# Patient Record
Sex: FEMALE | Race: WHITE | HISPANIC OR LATINO | Employment: FULL TIME | URBAN - METROPOLITAN AREA
[De-identification: names, ages, dates, MRNs, and addresses within clinical notes are randomized per-mention and may not be internally consistent; named-entity substitution may affect disease eponyms.]

---

## 2020-12-01 ENCOUNTER — HOSPITAL ENCOUNTER (OUTPATIENT)
Dept: FAMILY MEDICINE CLINIC | Facility: CLINIC | Age: 50
Discharge: HOME | End: 2020-12-01
Attending: NURSE PRACTITIONER

## 2020-12-01 ENCOUNTER — OFFICE VISIT CONVERTED (OUTPATIENT)
Dept: FAMILY MEDICINE CLINIC | Facility: CLINIC | Age: 50
End: 2020-12-01
Attending: NURSE PRACTITIONER

## 2020-12-01 LAB
ALBUMIN SERPL-MCNC: 4.7 G/DL (ref 3.5–5)
ALBUMIN/GLOB SERPL: 1.6 {RATIO} (ref 1.4–2.6)
ALP SERPL-CCNC: 115 U/L (ref 42–98)
ALT SERPL-CCNC: 54 U/L (ref 10–40)
ANION GAP SERPL CALC-SCNC: 18 MMOL/L (ref 8–19)
APPEARANCE UR: CLEAR
AST SERPL-CCNC: 55 U/L (ref 15–50)
BASOPHILS # BLD AUTO: 0.06 10*3/UL (ref 0–0.2)
BASOPHILS NFR BLD AUTO: 0.7 % (ref 0–3)
BILIRUB SERPL-MCNC: 0.21 MG/DL (ref 0.2–1.3)
BILIRUB UR QL: NEGATIVE
BUN SERPL-MCNC: 26 MG/DL (ref 5–25)
BUN/CREAT SERPL: 23 {RATIO} (ref 6–20)
CALCIUM SERPL-MCNC: 10.2 MG/DL (ref 8.7–10.4)
CHLORIDE SERPL-SCNC: 102 MMOL/L (ref 99–111)
CHOLEST SERPL-MCNC: 157 MG/DL (ref 107–200)
CHOLEST/HDLC SERPL: 2.1 {RATIO} (ref 3–6)
COLOR UR: YELLOW
CONV ABS IMM GRAN: 0.04 10*3/UL (ref 0–0.2)
CONV CO2: 26 MMOL/L (ref 22–32)
CONV COLLECTION SOURCE (UA): NORMAL
CONV IMMATURE GRAN: 0.4 % (ref 0–1.8)
CONV TOTAL PROTEIN: 7.6 G/DL (ref 6.3–8.2)
CONV UROBILINOGEN IN URINE BY AUTOMATED TEST STRIP: 1 {EHRLICHU}/DL (ref 0.1–1)
CREAT UR-MCNC: 1.15 MG/DL (ref 0.5–0.9)
DEPRECATED RDW RBC AUTO: 45.6 FL (ref 36.4–46.3)
EOSINOPHIL # BLD AUTO: 0.35 10*3/UL (ref 0–0.7)
EOSINOPHIL # BLD AUTO: 3.9 % (ref 0–7)
ERYTHROCYTE [DISTWIDTH] IN BLOOD BY AUTOMATED COUNT: 13.2 % (ref 11.7–14.4)
EST. AVERAGE GLUCOSE BLD GHB EST-MCNC: 140 MG/DL
GFR SERPLBLD BASED ON 1.73 SQ M-ARVRAT: 56 ML/MIN/{1.73_M2}
GLOBULIN UR ELPH-MCNC: 2.9 G/DL (ref 2–3.5)
GLUCOSE SERPL-MCNC: 138 MG/DL (ref 65–99)
GLUCOSE UR QL: NEGATIVE MG/DL
HBA1C MFR BLD: 6.5 % (ref 3.5–5.7)
HCT VFR BLD AUTO: 51.2 % (ref 37–47)
HDLC SERPL-MCNC: 76 MG/DL (ref 40–60)
HGB BLD-MCNC: 16.2 G/DL (ref 12–16)
HGB UR QL STRIP: NEGATIVE
KETONES UR QL STRIP: NEGATIVE MG/DL
LDLC SERPL CALC-MCNC: 67 MG/DL (ref 70–100)
LEUKOCYTE ESTERASE UR QL STRIP: NEGATIVE
LYMPHOCYTES # BLD AUTO: 2.35 10*3/UL (ref 1–5)
LYMPHOCYTES NFR BLD AUTO: 26.4 % (ref 20–45)
MCH RBC QN AUTO: 29.7 PG (ref 27–31)
MCHC RBC AUTO-ENTMCNC: 31.6 G/DL (ref 33–37)
MCV RBC AUTO: 93.9 FL (ref 81–99)
MONOCYTES # BLD AUTO: 0.86 10*3/UL (ref 0.2–1.2)
MONOCYTES NFR BLD AUTO: 9.7 % (ref 3–10)
NEUTROPHILS # BLD AUTO: 5.24 10*3/UL (ref 2–8)
NEUTROPHILS NFR BLD AUTO: 58.9 % (ref 30–85)
NITRITE UR QL STRIP: NEGATIVE
NRBC CBCN: 0 % (ref 0–0.7)
OSMOLALITY SERPL CALC.SUM OF ELEC: 301 MOSM/KG (ref 273–304)
PH UR STRIP.AUTO: 5.5 [PH] (ref 5–8)
PLATELET # BLD AUTO: 469 10*3/UL (ref 130–400)
PMV BLD AUTO: 10.1 FL (ref 9.4–12.3)
POTASSIUM SERPL-SCNC: 4.1 MMOL/L (ref 3.5–5.3)
PROT UR QL: NEGATIVE MG/DL
RBC # BLD AUTO: 5.45 10*6/UL (ref 4.2–5.4)
SODIUM SERPL-SCNC: 142 MMOL/L (ref 135–147)
SP GR UR: 1.02 (ref 1–1.03)
T4 FREE SERPL-MCNC: 1.3 NG/DL (ref 0.9–1.8)
TRIGL SERPL-MCNC: 70 MG/DL (ref 40–150)
TSH SERPL-ACNC: 1.33 M[IU]/L (ref 0.27–4.2)
VLDLC SERPL-MCNC: 14 MG/DL (ref 5–37)
WBC # BLD AUTO: 8.9 10*3/UL (ref 4.8–10.8)

## 2021-01-08 ENCOUNTER — TELEMEDICINE CONVERTED (OUTPATIENT)
Dept: FAMILY MEDICINE CLINIC | Facility: CLINIC | Age: 51
End: 2021-01-08
Attending: NURSE PRACTITIONER

## 2021-01-22 ENCOUNTER — OFFICE VISIT CONVERTED (OUTPATIENT)
Dept: FAMILY MEDICINE CLINIC | Facility: CLINIC | Age: 51
End: 2021-01-22
Attending: NURSE PRACTITIONER

## 2021-03-17 ENCOUNTER — HOSPITAL ENCOUNTER (OUTPATIENT)
Dept: FAMILY MEDICINE CLINIC | Facility: CLINIC | Age: 51
Discharge: HOME | End: 2021-03-17
Attending: NURSE PRACTITIONER

## 2021-03-17 ENCOUNTER — OFFICE VISIT CONVERTED (OUTPATIENT)
Dept: FAMILY MEDICINE CLINIC | Facility: CLINIC | Age: 51
End: 2021-03-17
Attending: NURSE PRACTITIONER

## 2021-03-17 ENCOUNTER — CONVERSION ENCOUNTER (OUTPATIENT)
Dept: FAMILY MEDICINE CLINIC | Facility: CLINIC | Age: 51
End: 2021-03-17

## 2021-03-17 LAB
BASOPHILS # BLD AUTO: 0.05 10*3/UL (ref 0–0.2)
BASOPHILS NFR BLD AUTO: 0.6 % (ref 0–3)
CONV ABS IMM GRAN: 0.05 10*3/UL (ref 0–0.2)
CONV CREATININE URINE, RANDOM: 97.4 MG/DL (ref 10–300)
CONV IMMATURE GRAN: 0.6 % (ref 0–1.8)
CONV MICROALBUM.,U,RANDOM: <12 MG/L (ref 0–20)
DEPRECATED RDW RBC AUTO: 49.1 FL (ref 36.4–46.3)
EOSINOPHIL # BLD AUTO: 0.26 10*3/UL (ref 0–0.7)
EOSINOPHIL # BLD AUTO: 3.1 % (ref 0–7)
ERYTHROCYTE [DISTWIDTH] IN BLOOD BY AUTOMATED COUNT: 13.8 % (ref 11.7–14.4)
HCT VFR BLD AUTO: 46.5 % (ref 37–47)
HGB BLD-MCNC: 14.4 G/DL (ref 12–16)
LYMPHOCYTES # BLD AUTO: 2.03 10*3/UL (ref 1–5)
LYMPHOCYTES NFR BLD AUTO: 24.3 % (ref 20–45)
MCH RBC QN AUTO: 29.9 PG (ref 27–31)
MCHC RBC AUTO-ENTMCNC: 31 G/DL (ref 33–37)
MCV RBC AUTO: 96.7 FL (ref 81–99)
MICROALBUMIN/CREAT UR: 12.3 MG/G{CRE} (ref 0–35)
MONOCYTES # BLD AUTO: 0.93 10*3/UL (ref 0.2–1.2)
MONOCYTES NFR BLD AUTO: 11.1 % (ref 3–10)
NEUTROPHILS # BLD AUTO: 5.05 10*3/UL (ref 2–8)
NEUTROPHILS NFR BLD AUTO: 60.3 % (ref 30–85)
NRBC CBCN: 0 % (ref 0–0.7)
PLATELET # BLD AUTO: 458 10*3/UL (ref 130–400)
PMV BLD AUTO: 10.7 FL (ref 9.4–12.3)
RBC # BLD AUTO: 4.81 10*6/UL (ref 4.2–5.4)
WBC # BLD AUTO: 8.37 10*3/UL (ref 4.8–10.8)

## 2021-03-18 LAB
ALBUMIN SERPL-MCNC: 4.6 G/DL (ref 3.5–5)
ALBUMIN/GLOB SERPL: 1.6 {RATIO} (ref 1.4–2.6)
ALP SERPL-CCNC: 115 U/L (ref 42–98)
ALT SERPL-CCNC: 32 U/L (ref 10–40)
ANION GAP SERPL CALC-SCNC: 19 MMOL/L (ref 8–19)
AST SERPL-CCNC: 31 U/L (ref 15–50)
BILIRUB SERPL-MCNC: <0.15 MG/DL (ref 0.2–1.3)
BUN SERPL-MCNC: 13 MG/DL (ref 5–25)
BUN/CREAT SERPL: 22 {RATIO} (ref 6–20)
CALCIUM SERPL-MCNC: 8.9 MG/DL (ref 8.7–10.4)
CHLORIDE SERPL-SCNC: 108 MMOL/L (ref 99–111)
CHOLEST SERPL-MCNC: 140 MG/DL (ref 107–200)
CHOLEST/HDLC SERPL: 1.8 {RATIO} (ref 3–6)
CONV CO2: 24 MMOL/L (ref 22–32)
CONV TOTAL PROTEIN: 7.4 G/DL (ref 6.3–8.2)
CREAT UR-MCNC: 0.59 MG/DL (ref 0.5–0.9)
EST. AVERAGE GLUCOSE BLD GHB EST-MCNC: 137 MG/DL
GFR SERPLBLD BASED ON 1.73 SQ M-ARVRAT: >60 ML/MIN/{1.73_M2}
GLOBULIN UR ELPH-MCNC: 2.8 G/DL (ref 2–3.5)
GLUCOSE SERPL-MCNC: 87 MG/DL (ref 65–99)
HBA1C MFR BLD: 6.4 % (ref 3.5–5.7)
HDLC SERPL-MCNC: 76 MG/DL (ref 40–60)
LDLC SERPL CALC-MCNC: 48 MG/DL (ref 70–100)
OSMOLALITY SERPL CALC.SUM OF ELEC: 303 MOSM/KG (ref 273–304)
POTASSIUM SERPL-SCNC: 3.9 MMOL/L (ref 3.5–5.3)
SODIUM SERPL-SCNC: 147 MMOL/L (ref 135–147)
TRIGL SERPL-MCNC: 78 MG/DL (ref 40–150)
VLDLC SERPL-MCNC: 16 MG/DL (ref 5–37)

## 2021-05-10 NOTE — H&P
History and Physical      Patient Name: Beba Ambrose   Patient ID: [id number]   Sex: Female   YOB: 1970    Primary Care Provider: Ross Fabian MD   Referring Provider: Ross Fabian MD    Visit Date: December 1, 2020    Provider: AJ Santos   Location: [facility]   Location Address: [address] [place] [address], [address] [place] [address]  [id number]   Location Phone: [phone]          Chief Complaint  · new pt      History Of Present Illness  Beba Ambrose is a 50 year old /White female who presents for evaluation and treatment of:      She presents as a new patient today to establish care.  Her previous provider was Dr. Nuñez who has closed his practice.    She states she has a history of ADHD and is currently stable on Adderall.    Hypertension: Blood pressure stable on lisinopril 10 mg twice daily.    History of asthma: She is currently stable, only uses albuterol inhaler as needed.    Depression screening positive, PHQ 9 score is 9.  She admits to feeling depressed, states mostly due to current situation of having to work from home.  She is a .  She states she also does have a history of depression and she has been on Zoloft in the past.  She does feel that she would benefit to take an antidepressant.    She states that she has a history of glucose in her urine but they have never found a cause.  She has seen urology also for blood in her urine.       Past Medical History  Disease Name Date Onset Notes   Acute cystitis 07/15/2016 --    Allergies --  --    Anxiety --  --    Asthma --  --    Broken Bones --  --    Depression --  --    Diabetes --  --    High blood pressure --  --    Migraine headache --  --          Past Surgical History  Procedure Name Date Notes   Breast augmentation --  --    Vaginal biopsy --  --          Medication List  Name Date Started Instructions   Adderall 20 mg oral tablet  --    albuterol sulfate  "inhalation  --    lisinopril 10 mg oral tablet  take 1 tablet (10 mg) by oral route once daily         Allergy List  Allergen Name Date Reaction Notes   NO KNOWN DRUG ALLERGIES --  --  --          Family Medical History  Disease Name Relative/Age Notes   Cancer, Unspecified  --    Parkinson's Disease Father/   --    *No Known Family History  --          Social History  Finding Status Start/Stop Quantity Notes   Alcohol Current some day 0/0 --  drinks monthly; wine   Caffeine Current every day 0/0 --  drinks regularly; soft drinks; 1-2 times per day   Second hand smoke exposure Current some day 0/0 --  yes   Tobacco Former 1/0 1/2 ppd current everyday smoker; started smoking at age 2; smoked 10 cigarette(s) per day         Review of Systems  · Constitutional  o Denies  o : fever, fatigue, weight loss, weight gain  · Cardiovascular  o Denies  o : lower extremity edema, claudication, chest pressure, palpitations  · Respiratory  o Denies  o : shortness of breath, wheezing, cough, hemoptysis, dyspnea on exertion  · Gastrointestinal  o Denies  o : nausea, vomiting, diarrhea, constipation, abdominal pain  · Genitourinary  o Denies  o : urgency, frequency  · Integument  o Denies  o : rash, itching  · Neurologic  o Admits  o : difficulty concentrating  o Denies  o : altered mental status  · Musculoskeletal  o Denies  o : joint pain, joint swelling  · Endocrine  o Denies  o : cold intolerance, central obesity  · Psychiatric  o Admits  o : depression, difficulty sleeping  o Denies  o : anxiety, suicidal ideation, homicidal ideation      Vitals  Date Time BP Position Site L\R Cuff Size HR RR TEMP (F) WT  HT  BMI kg/m2 BSA m2 O2 Sat FR L/min FiO2        12/01/2020 08:46 /72 Sitting    106 - R  98.1 97lbs 16oz 5'  2\" 17.92 1.39 93 %            Physical Examination  · Constitutional  o Appearance  o : no acute distress, well-nourished  · Head and Face  o Head  o :   § Inspection  § : atraumatic, " normocephalic  · Neck  o Thyroid  o : gland size normal, nontender, no nodules or masses present on palpation, symmetric  · Respiratory  o Respiratory Effort  o : breathing comfortably, symmetric chest rise  o Auscultation of Lungs  o : clear to asculatation bilaterally, no wheezes, rales, or rhonchii  · Cardiovascular  o Heart  o :   § Auscultation of Heart  § : regular rate and rhythm, no murmurs, rubs, or gallops  o Peripheral Vascular System  o :   § Extremities  § : no edema  · Lymphatic  o Neck  o : no lymphadenopathy present  · Neurologic  o Mental Status Examination  o :   § Orientation  § : grossly oriented to person, place and time  o Gait and Station  o :   § Gait Screening  § : normal gait  · Psychiatric  o General  o : normal mood and affect  o Presence of Abnormal Thoughts  o : no hallucinations, no delusions present, no psychotic thoughts, no homicidal ideation, no suicidal ideation, no evidence of obsessional thinking          Assessment  · Visit for screening mammogram     V76.12/Z12.31  · Asthma     493.90/J45.909  Asthma stable, has albuterol rescue inhaler.  · Depression     311/F32.9  We discussed her depression and decided to start her on Zoloft 50 mg, advised patient to start with a half a tablet once daily for 1 week and then increase to full tablet.  · Essential hypertension     401.9/I10  Blood pressure stable on Lisinopril 10 mg twice daily.  · Glucose found in urine on examination     791.5/R81  History of glucose in her urine, we will check an A1c today and we will send a urinalysis for microscopy.  · ADHD (attention deficit hyperactivity disorder)     314.01/F90.9  I discussed with her that I cannot prescribe Adderall. I will refer her to psychiatry.      Plan  · Orders  o Screening Mammography; Bilateral 3D (71957, , [address]106) - V76.12/Z12.31 - 12/01/2020  o HTN/Lipid Panel (CMP, Lipid) [facility] (51212, 37814) - 401.9/I10 - 12/01/2020  o CBC with Auto Diff [facility] (29824) - 401.9/I10 -  12/01/2020  o Hgb A1c [facility] (88385) - 791.5/R81, 401.9/I10, 493.90/J45.909, 314.01/F90.9 - 12/01/2020  o Thyroid Profile (90717, 51416, THYII) - 791.5/R81, 401.9/I10, 493.90/J45.909, 314.01/F90.9 - 12/01/2020  o Urinalysis with Reflex Microscopy ([facility]) (41732) - 791.5/R81, 401.9/I10, 493.90/J45.909, 314.01/F90.9 - 12/01/2020  o ACO-39: Current medications updated and reviewed (, 1159F) - - 12/01/2020  o ACO-18: Positive screen for clinical depression using a standardized tool and a follow-up plan documented () - 311/F32.9, 314.01/F90.9 - 12/01/2020   12 pts  o Psychiatric Consultation (PSYCH) - 314.01/F90.9, 311/F32.9 - 12/01/2020   AJ De La Paz, pt is on Adderall  · Medications  o Zoloft 50 mg oral tablet   SIG: take 1 tablet (50 mg) by oral route once daily for 30 days   DISP: (30) Tablet with 5 refills  Prescribed on 12/01/2020     o albuterol sulfate 90 mcg/actuation inhalation HFA aerosol inhaler   SIG: inhale 2 puffs (180 mcg) by inhalation route every 4 hours as needed for 30 days   DISP: (1) Inhaler with 11 refills  Adjusted on 12/01/2020     o lisinopril 10 mg oral tablet   SIG: take 2 tablets (20 mg) by oral route once daily for 30 days   DISP: (60) Tablet with 5 refills  Adjusted on 12/01/2020     · Instructions  o Discussed the need for therapy, either with a certified counselor, psychologist, and/or family . If no improvement is noted or worsening of their condition, return to office or ER. But also discussed with patient that if they are non-responsive to the type of medication they may need to see a psychiatrist for further evaluation and management.  o Patient was given an SSRI/SSNRI medication and warned of possible side effects of the medication including potential for increased risk of suicidal thoughts and feelings. Patient was instructed that if they begin to exhibit any of these effects they will discontinue the medication immediately and contact our office or the ER  ASAP.  o Patient was educated/instructed on their diagnosis, treatment and medications prior to discharge from the clinic today.  o Patient instructed to seek medical attention urgently for new or worsening symptoms.  o Call the office with any concerns or questions.  o Discussed Covid-19 precautions including, but not limited to, social distancing, avoid touching your face, and hand washing.   · Disposition  o Return to clinic in 4 weeks            Electronically Signed by: AJ Santos -Author on December 1, 2020 01:15:59 PM

## 2021-05-14 VITALS
SYSTOLIC BLOOD PRESSURE: 162 MMHG | BODY MASS INDEX: 27.14 KG/M2 | WEIGHT: 147.5 LBS | OXYGEN SATURATION: 98 % | DIASTOLIC BLOOD PRESSURE: 92 MMHG | HEIGHT: 62 IN | TEMPERATURE: 97.5 F | HEART RATE: 101 BPM

## 2021-05-14 VITALS
OXYGEN SATURATION: 97 % | BODY MASS INDEX: 27.88 KG/M2 | SYSTOLIC BLOOD PRESSURE: 126 MMHG | HEART RATE: 106 BPM | DIASTOLIC BLOOD PRESSURE: 78 MMHG | WEIGHT: 151.5 LBS | HEIGHT: 62 IN | TEMPERATURE: 98 F

## 2021-05-14 VITALS
SYSTOLIC BLOOD PRESSURE: 128 MMHG | BODY MASS INDEX: 18.03 KG/M2 | HEIGHT: 62 IN | HEART RATE: 106 BPM | OXYGEN SATURATION: 93 % | WEIGHT: 98 LBS | DIASTOLIC BLOOD PRESSURE: 72 MMHG | TEMPERATURE: 98.1 F

## 2021-05-14 NOTE — PROGRESS NOTES
Progress Note      Patient Name: Beba Ambrose   Patient ID: [id number]   Sex: Female   YOB: 1970    Primary Care Provider: Margarita ROCHA   Referring Provider: Margarita ROCHA    Visit Date: January 22, 2021    Provider: AJ Santos   Location: [facility]   Location Address: [address] [place] [address], [address] [place] [address]  [id number]   Location Phone: [phone]          Chief Complaint  · still having problems breathing      History Of Present Illness  Beba Ambrose is a 50 year old /White female who presents for evaluation and treatment of:      She is here today for an acute visit.  She states she is still having trouble breathing even after finishing prednisone Dosepak.  She has a history of asthma and she states it always flares up in the wintertime.  She is complaining of wheezing mostly at night, shortness of breath and occasional productive cough.  She is not currently on any maintenance medication.  She uses her albuterol inhaler as needed.    She does have a history of diabetes type 2, non-insulin-dependent.  She is stable on Januvia 50 mg daily.  Her last A1c was 6.5% on 12/1/2020.       Past Medical History  Disease Name Date Onset Notes   Acute cystitis 07/15/2016 --    ADHD (attention deficit hyperactivity disorder) 12/01/2020 --    Allergies --  --    Anxiety --  --    Asthma --  --    Broken Bones --  --    Depression --  --    Depression 12/01/2020 --    Diabetes --  --    Diabetes mellitus, type 2 01/08/2021 --    Essential hypertension 12/01/2020 --    Glucose found in urine on examination 12/01/2020 History of glucose in her urine, we will check an A1c today and we will send a urinalysis for microscopy.   High blood pressure --  --    Migraine headache --  --          Past Surgical History  Procedure Name Date Notes   Breast augmentation --  --    Vaginal biopsy --  --          Medication List  Name Date Started  "Instructions   Adderall 20 mg oral tablet  --    albuterol sulfate 90 mcg/actuation inhalation HFA aerosol inhaler 12/01/2020 inhale 2 puffs (180 mcg) by inhalation route every 4 hours as needed for 30 days   Januvia 50 mg oral tablet 01/08/2021 take 1 tablet (50 mg) by oral route once daily for 30 days   lisinopril 10 mg oral tablet 12/01/2020 take 2 tablets (20 mg) by oral route once daily for 30 days   Zoloft 50 mg oral tablet 12/01/2020 take 1 tablet (50 mg) by oral route once daily for 30 days         Allergy List  Allergen Name Date Reaction Notes   NO KNOWN DRUG ALLERGIES --  --  --    metformin --  upset stomach --        Allergies Reconciled  Family Medical History  Disease Name Relative/Age Notes   Cancer, Unspecified  --    Parkinson's Disease Father/   --    *No Known Family History  --          Social History  Finding Status Start/Stop Quantity Notes   Alcohol Current some day 0/0 --  drinks monthly; wine   Caffeine Current every day 0/0 --  drinks regularly; soft drinks; 1-2 times per day   Second hand smoke exposure Current some day 0/0 --  yes   Tobacco Former 1/0 1/2 ppd current everyday smoker; started smoking at age 2; smoked 10 cigarette(s) per day         Review of Systems  · Constitutional  o Denies  o : fever, fatigue, weight loss, weight gain  · Cardiovascular  o Denies  o : lower extremity edema, claudication, chest pressure, palpitations  · Respiratory  o Admits  o : shortness of breath, wheezing, productive cough  o Denies  o : hemoptysis  · Gastrointestinal  o Denies  o : nausea, vomiting, diarrhea, constipation, abdominal pain  · Integument  o Denies  o : rash, itching  · Endocrine  o Denies  o : polyuria, polydipsia      Vitals  Date Time BP Position Site L\R Cuff Size HR RR TEMP (F) WT  HT  BMI kg/m2 BSA m2 O2 Sat FR L/min FiO2 HC       01/22/2021 01:27 /92 Sitting    101 - R  97.5 147lbs 8oz 5'  2\" 26.98 1.71 98 %            Physical " Examination  · Constitutional  o Appearance  o : no acute distress, well-nourished  · Head and Face  o Head  o :   § Inspection  § : atraumatic, normocephalic  · Respiratory  o Respiratory Effort  o : breathing comfortably, symmetric chest rise  o Auscultation of Lungs  o : clear to asculatation bilaterally, no wheezes, rales, or rhonchii  · Cardiovascular  o Heart  o :   § Auscultation of Heart  § : regular rate and rhythm, no murmurs, rubs, or gallops  o Peripheral Vascular System  o :   § Extremities  § : no edema  · Neurologic  o Mental Status Examination  o :   § Orientation  § : grossly oriented to person, place and time  o Gait and Station  o :   § Gait Screening  § : normal gait  · Psychiatric  o General  o : normal mood and affect          Assessment  · Mild persistent asthma with acute exacerbation     493.92/J45.31  Since she is having persistent asthma uncontrolled, I will start her on Symbicort inhaler daily, advised her to rinse her mouth after each use. We will also give her another prednisone Dosepak. Advised her to continue use her albuterol inhaler as needed. Advised her to call next week if not improving.  · Diabetes mellitus, type 2     250.00/E11.9  Stable on Januvia      Plan  · Orders  o ACO-14: Influenza immunization was not administered for reasons documented [facility] () - - 01/22/2021   refused  o ACO-39: Current medications updated and reviewed (, 1159F) - - 01/22/2021  · Medications  o prednisone 20 mg oral tablet   SIG: take 3 tabs (60mg) daily x 2 days, then 2 tabs daily (40mg) x 2 days, then 1 tab (20mg) daily x 2 days   DISP: (12) Tablet with 0 refills  Prescribed on 01/22/2021     o Symbicort 160-4.5 mcg/actuation inhalation HFA aerosol inhaler   SIG: inhale 2 puffs by inhalation route 2 times per day in the morning and evening for 30 days   DISP: (1) Inhaler with 2 refills  Prescribed on 01/22/2021     · Instructions  o Patient was educated/instructed on their diagnosis,  treatment and medications prior to discharge from the clinic today.  o Patient instructed to seek medical attention urgently for new or worsening symptoms.  o Call the office with any concerns or questions.  · Disposition  o Call or Return if symptoms worsen or persist.            Electronically Signed by: AJ Santos -Author on January 22, 2021 02:12:08 PM

## 2021-05-14 NOTE — PROGRESS NOTES
Progress Note      Patient Name: Beba Ambrose   Patient ID: [id number]   Sex: Female   YOB: 1970    Primary Care Provider: Margarita ROCHA   Referring Provider: Margarita ROCHA    Visit Date: March 17, 2021    Provider: AJ Santos   [place] [facility]   Location Address: [address] [place] [address], [address]  [id number]   Location Phone: [phone]          Chief Complaint  · 3 month follow up      History Of Present Illness  Beba Ambrose is a 51 year old /White female who presents for evaluation and treatment of:      Patient present today for 3 month follow up. Requesting accomadation for remote work location to be filled out. Patient is also requesting the shingles vaccine. Patient stated that she was interested in cologuard. There is no family history of colon cancer.     Hx DM II: Patient last A1C 12/01/20 was 6.5%. Patient currently taking Januvia 50 mg and is tolerating well. Patient states that she believes her last eye exam was over three years ago. Discussed with patient the importance of annual eye examination for person with diabetes and importance of doing self foot exams.      Hx nicotine dependency: Patient states that she quit smoking approximately 2 1/2 years ago.     Hx HTN: Patient has been stable on lisinopril 10mg. Blood pressure at visit today was 126/78.    Hx Asthma: Patient has been stable on albuterol and symbicort.            Past Medical History  Disease Name Date Onset Notes   Acute cystitis 07/15/2016 --    ADHD (attention deficit hyperactivity disorder) 12/01/2020 --    Allergies --  --    Anxiety --  --    Asthma --  --    Broken Bones --  --    Depression --  --    Depression 12/01/2020 --    Diabetes --  --    Diabetes mellitus, type 2 01/08/2021 --    Essential hypertension 12/01/2020 --    Glucose found in urine on examination 12/01/2020 History of glucose in her urine, we will check an A1c  today and we will send a urinalysis for microscopy.   High blood pressure --  --    Migraine headache --  --          Past Surgical History  Procedure Name Date Notes   Breast augmentation --  --    Vaginal biopsy --  --          Medication List  Name Date Started Instructions   Adderall 20 mg oral tablet  --    albuterol sulfate 90 mcg/actuation inhalation HFA aerosol inhaler 12/01/2020 inhale 2 puffs (180 mcg) by inhalation route every 4 hours as needed for 30 days   Januvia 50 mg oral tablet 01/08/2021 take 1 tablet (50 mg) by oral route once daily for 30 days   lisinopril 10 mg oral tablet 12/01/2020 take 2 tablets (20 mg) by oral route once daily for 30 days   Symbicort 160-4.5 mcg/actuation inhalation HFA aerosol inhaler 01/22/2021 inhale 2 puffs by inhalation route 2 times per day in the morning and evening for 30 days   Zoloft 100 mg oral tablet  take 1 tablet (100 mg) by oral route once daily         Allergy List  Allergen Name Date Reaction Notes   NO KNOWN DRUG ALLERGIES --  --  --    metformin --  upset stomach --          Family Medical History  Disease Name Relative/Age Notes   Cancer, Unspecified  --    Parkinson's Disease Father/   --    *No Known Family History  --          Social History  Finding Status Start/Stop Quantity Notes   Alcohol Current some day 0/0 --  drinks monthly; wine   Caffeine Current every day 0/0 --  drinks regularly; soft drinks; 1-2 times per day   Second hand smoke exposure Current some day 0/0 --  yes   Tobacco Former 1/0 1/2 ppd current everyday smoker; started smoking at age 2; smoked 10 cigarette(s) per day         Review of Systems  · Constitutional  o Denies  o : fever, fatigue, weight loss, weight gain  · Cardiovascular  o Denies  o : lower extremity edema, claudication, chest pressure, palpitations  · Respiratory  o Denies  o : shortness of breath, wheezing, cough, hemoptysis, dyspnea on exertion  · Gastrointestinal  o Denies  o : nausea, vomiting, diarrhea,  "constipation, abdominal pain  · Genitourinary  o Denies  o : urgency, frequency  · Endocrine  o Denies  o : polyuria, polydipsia      Vitals  Date Time BP Position Site L\R Cuff Size HR RR TEMP (F) WT  HT  BMI kg/m2 BSA m2 O2 Sat FR L/min FiO2 HC       03/17/2021 02:04 /78 Sitting    106 - R  98 151lbs 8oz 5'  2\" 27.71 1.73 97 %            Physical Examination  · Constitutional  o Appearance  o : no acute distress, well-nourished  · Head and Face  o Head  o :   § Inspection  § : atraumatic, normocephalic  · Neck  o Thyroid  o : gland size normal, nontender, no nodules or masses present on palpation, symmetric  · Respiratory  o Respiratory Effort  o : breathing comfortably, symmetric chest rise  o Auscultation of Lungs  o : clear to asculatation bilaterally, no wheezes, rales, or rhonchii  · Cardiovascular  o Heart  o :   § Auscultation of Heart  § : regular rate and rhythm, no murmurs, rubs, or gallops  o Peripheral Vascular System  o :   § Extremities  § : no edema  · Lymphatic  o Neck  o : no lymphadenopathy present  · Psychiatric  o General  o : normal mood and affect          Assessment  · Need for shingles vaccine     V04.89/Z23  Order sent to pharmacy for shingrix. Patient informed not to receive within two weeks of any other vaccine in order to determine side effects and because it is a live vaccine.  · Screening for colon cancer     V76.51/Z12.11  Order sent for cologuard.  · Asthma     493.90/J45.909  Patient stable on medication as listed.  · Diabetes mellitus, type 2     250.00/E11.9  Patient stable on medication as listed. Will recheck A1C and microalbumin today. Patient will receive call with results.  · Essential hypertension     401.9/I10  Patient stable on medication as listed.      Plan  · Orders  o Cologuard (68435) - V76.51/Z12.11 - 03/17/2021  o Diabetes 1 Panel (CMP, Lipid, A1c) [facility] (71001, 07128, 65361) - 250.00/E11.9 - 03/17/2021  o CBC with Auto Diff [facility] (57387) - 250.00/E11.9 - " 03/17/2021  o Urine microalbumin (33526) - 250.00/E11.9, 401.9/I10 - 03/17/2021  o ACO-14: Influenza immunization was not administered for reasons documented [facility] () - - 03/17/2021   patient declines  o ACO-39: Current medications updated and reviewed (1159F, ) - - 03/17/2021  · Medications  o Shingrix (PF) 50 mcg/0.5 mL intramuscular suspension for reconstitution   SIG: inject 0.5 ml (50 mcg) by intramuscular route once repeat 0.5 mL dose 2 to 6 months after the first dose (total of 2 doses)   DISP: (1) Kit with 0 refills  Prescribed on 03/17/2021     o Medications have been Reconciled  o Transition of Care or Provider Policy  · Instructions  o Discussed the need for Shingles vaccination with patient.   o Continue blood sugar monitoring daily and record. Bring your log to office visits. Call the office for readings below 70 and above 250 or any complications.  o Daily foot care. Avoid walking barefoot. Annual Dilated Eye Exam.  o Discussed with patient blood pressure monitoring, hemoglobin A1C levels need to be below 7.0, and LDL (Lipid) goals below 70.  o Patient was educated/instructed on their diagnosis, treatment and medications prior to discharge from the clinic today.  o Patient instructed to seek medical attention urgently for new or worsening symptoms.  o Call the office with any concerns or questions.  · Disposition  o Return to clinic in 3 months            Electronically Signed by: AJ Santos -Author on March 18, 2021 03:25:28 PM

## 2021-05-14 NOTE — PROGRESS NOTES
Progress Note      Patient Name: Beba Ambrose   Patient ID: [id number]   Sex: Female   YOB: 1970    Primary Care Provider: Margarita ROCHA   Referring Provider: Margarita ROCHA    Visit Date: January 8, 2021    Provider: AJ Santos   Location: [facility]   Location Address: [address] [place] [address], [address]  [id number]   Location Phone: [phone]          Chief Complaint  · Discussed medication      History Of Present Illness  Video Conferencing Visit  Beba Ambrose is a 50 year old /White female who is presenting for evaluation via video conferencing via [employer]. Verbal consent obtained before beginning visit.   The following staff were present during this visit: AJ Orellana.   Beba Ambrose is a 50 year old /White female who presents for evaluation and treatment of:      She is newly diagnosed with diabetes type 2 with A1c of 6.5%.  She was started on Metformin 500 mg twice daily with food but she states she is not tolerating the medication.  She complains it is upsetting her stomach.    She is complaining that her asthma is flared up and that she has been wheezing lately.  She has been using albuterol inhaler some but she is requesting a prescription for prednisone Dosepak.    History of ADHD: She has been referred to psychiatric nurse practitioner Jed Guillermo but does not have an appointment until 1/20/2021.  She had been on Adderall 20 mg 3 times daily from her previous provider.  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  She states that her ex-spouse passed away and she is now the sole provider for her family and is a single mom.  She is going through quite a bit of stressors right now and is having a hard time focusing.       Review of Systems  · Constitutional  o Denies  o : fever, fatigue, weight loss, weight  gain  · Cardiovascular  o Denies  o : lower extremity edema, claudication, chest pressure, palpitations  · Respiratory  o Admits  o : wheezing  o Denies  o : shortness of breath, cough, hemoptysis, dyspnea on exertion  · Gastrointestinal  o Admits  o : nausea  o Denies  o : vomiting, diarrhea, constipation  · Genitourinary  o Denies  o : urgency, frequency  · Integument  o Denies  o : rash, itching  · Neurologic  o Admits  o : difficulty concentrating  o Denies  o : altered mental status  · Psychiatric  o Denies  o : anxiety, depression, suicidal ideation, homicidal ideation      Physical Examination  · Constitutional  o Appearance  o : no acute distress, well-nourished  · Head and Face  o Head  o :   § Inspection  § : atraumatic, normocephalic  · Respiratory  o Respiratory Effort  o : breathing comfortably, symmetric chest rise  · Neurologic  o Mental Status Examination  o :   § Orientation  § : grossly oriented to person, place and time  · Psychiatric  o General  o : normal mood and affect  o Presence of Abnormal Thoughts  o : no hallucinations, no delusions present, no psychotic thoughts, no homicidal ideation, no suicidal ideation, no evidence of obsessional thinking          Assessment  · Asthma     493.90/J45.909  Asthma flareup, I will give her prednisone Dosepak. Advised to continue using albuterol inhaler or nebulizer. Advised her to notify me if she is not feeling better next week.  · Diabetes mellitus, type 2     250.00/E11.9  I will have her stop Metformin since she is having upset stomach. I will start her on Januvia 50 mg once daily.  · ADHD (attention deficit hyperactivity disorder)     314.01/F90.9  I discussed her case with Dr. Johnston who is agreed to give her 2-week supply of Adderall until she can get in with the psych NP.      Plan  · Orders  o ACO-39: Current medications updated and reviewed (, 1159F) - - 01/08/2021  · Medications  o Januvia 50 mg oral tablet   SIG: take 1 tablet (50 mg) by  oral route once daily for 30 days   DISP: (30) Tablet with 2 refills  Prescribed on 01/08/2021     o prednisone 20 mg oral tablet   SIG: take 3 tabs (60mg) daily x 2 days, then 2 tabs daily (40mg) x 2 days, then 1 tab (20mg) daily x 2 days   DISP: (12) Tablet with 0 refills  Prescribed on 01/08/2021     o metformin 500 mg oral tablet   SIG: take 1 tablet (500 mg) by oral route 2 times per day with morning and evening meals for 30 days   DISP: (60) Tablet with 2 refills  Discontinued on 01/08/2021     · Instructions  o Discussed with patient blood pressure monitoring, hemoglobin A1C levels need to be below 7.0, and LDL (Lipid) goals below 70.  o See note for indication of controlled substance. Georgi has been reviewed. After discussion of risks and benefits of medication, I have determined patient is suitable for Rx while demonstrating the ability to safely follow and administer the medication plan. Patient understands that this is a temporary prescription until she can get into see the psychiatric nurse practitioner.  o Patient was educated/instructed on their diagnosis, treatment and medications prior to discharge from the clinic today.  o Patient instructed to seek medical attention urgently for new or worsening symptoms.  o Call the office with any concerns or questions.  · Disposition  o Return to clinic in 2 months            Electronically Signed by: AJ Santos -Author on January 8, 2021 01:07:56 PM

## 2021-05-22 ENCOUNTER — TRANSCRIBE ORDERS (OUTPATIENT)
Dept: ADMINISTRATIVE | Facility: HOSPITAL | Age: 51
End: 2021-05-22

## 2021-05-22 DIAGNOSIS — Z12.31 VISIT FOR SCREENING MAMMOGRAM: Primary | ICD-10-CM

## 2021-06-14 PROBLEM — F32.A DEPRESSION: Status: ACTIVE | Noted: 2020-12-01

## 2021-06-14 PROBLEM — T14.8XXA BROKEN BONES: Status: ACTIVE | Noted: 2021-06-14

## 2021-06-14 PROBLEM — J45.909 ASTHMA: Status: ACTIVE | Noted: 2021-06-14

## 2021-06-14 PROBLEM — F41.9 ANXIETY: Status: ACTIVE | Noted: 2021-06-14

## 2021-06-14 PROBLEM — I10 HIGH BLOOD PRESSURE: Status: ACTIVE | Noted: 2020-12-01

## 2021-06-14 PROBLEM — E11.9 DIABETES: Status: ACTIVE | Noted: 2021-01-08

## 2021-06-14 PROBLEM — F90.9 ADHD (ATTENTION DEFICIT HYPERACTIVITY DISORDER): Status: ACTIVE | Noted: 2020-12-01

## 2021-06-14 PROBLEM — R81 GLUCOSE FOUND IN URINE ON EXAMINATION: Status: ACTIVE | Noted: 2020-12-01

## 2021-06-14 PROBLEM — G43.909 MIGRAINE HEADACHE: Status: ACTIVE | Noted: 2021-06-14

## 2021-06-14 RX ORDER — LISINOPRIL 10 MG/1
10 TABLET ORAL DAILY
COMMUNITY
End: 2021-06-18 | Stop reason: SDUPTHER

## 2021-06-14 RX ORDER — BUDESONIDE AND FORMOTEROL FUMARATE DIHYDRATE 160; 4.5 UG/1; UG/1
2 AEROSOL RESPIRATORY (INHALATION)
COMMUNITY
End: 2021-08-23

## 2021-06-14 RX ORDER — SERTRALINE HYDROCHLORIDE 100 MG/1
TABLET, FILM COATED ORAL
COMMUNITY
Start: 2021-05-06 | End: 2021-06-18 | Stop reason: SDUPTHER

## 2021-06-14 RX ORDER — SERTRALINE HYDROCHLORIDE 100 MG/1
100 TABLET, FILM COATED ORAL DAILY
COMMUNITY
End: 2021-12-21 | Stop reason: SDUPTHER

## 2021-06-14 RX ORDER — ALBUTEROL SULFATE 90 UG/1
2 AEROSOL, METERED RESPIRATORY (INHALATION) EVERY 4 HOURS PRN
COMMUNITY
End: 2022-02-07

## 2021-06-14 RX ORDER — DEXTROAMPHETAMINE SACCHARATE, AMPHETAMINE ASPARTATE, DEXTROAMPHETAMINE SULFATE AND AMPHETAMINE SULFATE 5; 5; 5; 5 MG/1; MG/1; MG/1; MG/1
TABLET ORAL
COMMUNITY
End: 2022-09-07

## 2021-06-18 ENCOUNTER — OFFICE VISIT (OUTPATIENT)
Dept: FAMILY MEDICINE CLINIC | Facility: CLINIC | Age: 51
End: 2021-06-18

## 2021-06-18 VITALS
BODY MASS INDEX: 29 KG/M2 | WEIGHT: 157.6 LBS | HEART RATE: 98 BPM | OXYGEN SATURATION: 100 % | SYSTOLIC BLOOD PRESSURE: 152 MMHG | HEIGHT: 62 IN | TEMPERATURE: 97.5 F | DIASTOLIC BLOOD PRESSURE: 76 MMHG

## 2021-06-18 DIAGNOSIS — F90.9 ATTENTION DEFICIT HYPERACTIVITY DISORDER (ADHD), UNSPECIFIED ADHD TYPE: ICD-10-CM

## 2021-06-18 DIAGNOSIS — I10 ESSENTIAL HYPERTENSION: ICD-10-CM

## 2021-06-18 DIAGNOSIS — F32.A DEPRESSION, UNSPECIFIED DEPRESSION TYPE: ICD-10-CM

## 2021-06-18 DIAGNOSIS — E11.9 TYPE 2 DIABETES MELLITUS WITHOUT COMPLICATION, WITHOUT LONG-TERM CURRENT USE OF INSULIN (HCC): Primary | ICD-10-CM

## 2021-06-18 DIAGNOSIS — J45.909 ASTHMA, UNSPECIFIED ASTHMA SEVERITY, UNSPECIFIED WHETHER COMPLICATED, UNSPECIFIED WHETHER PERSISTENT: ICD-10-CM

## 2021-06-18 LAB
ALBUMIN SERPL-MCNC: 4.7 G/DL (ref 3.5–5.2)
ALBUMIN/GLOB SERPL: 1.7 G/DL
ALP SERPL-CCNC: 112 U/L (ref 39–117)
ALT SERPL W P-5'-P-CCNC: 34 U/L (ref 1–33)
ANION GAP SERPL CALCULATED.3IONS-SCNC: 9.1 MMOL/L (ref 5–15)
AST SERPL-CCNC: 36 U/L (ref 1–32)
BASOPHILS # BLD AUTO: 0.06 10*3/MM3 (ref 0–0.2)
BASOPHILS NFR BLD AUTO: 1 % (ref 0–1.5)
BILIRUB SERPL-MCNC: 0.2 MG/DL (ref 0–1.2)
BUN SERPL-MCNC: 23 MG/DL (ref 6–20)
BUN/CREAT SERPL: 40.4 (ref 7–25)
CALCIUM SPEC-SCNC: 9.6 MG/DL (ref 8.6–10.5)
CHLORIDE SERPL-SCNC: 109 MMOL/L (ref 98–107)
CO2 SERPL-SCNC: 23.9 MMOL/L (ref 22–29)
CREAT SERPL-MCNC: 0.57 MG/DL (ref 0.57–1)
DEPRECATED RDW RBC AUTO: 44.1 FL (ref 37–54)
EOSINOPHIL # BLD AUTO: 0.25 10*3/MM3 (ref 0–0.4)
EOSINOPHIL NFR BLD AUTO: 4 % (ref 0.3–6.2)
ERYTHROCYTE [DISTWIDTH] IN BLOOD BY AUTOMATED COUNT: 13.6 % (ref 12.3–15.4)
GFR SERPL CREATININE-BSD FRML MDRD: 112 ML/MIN/1.73
GLOBULIN UR ELPH-MCNC: 2.7 GM/DL
GLUCOSE SERPL-MCNC: 96 MG/DL (ref 65–99)
HCT VFR BLD AUTO: 44.6 % (ref 34–46.6)
HGB BLD-MCNC: 14.8 G/DL (ref 12–15.9)
IMM GRANULOCYTES # BLD AUTO: 0.03 10*3/MM3 (ref 0–0.05)
IMM GRANULOCYTES NFR BLD AUTO: 0.5 % (ref 0–0.5)
LYMPHOCYTES # BLD AUTO: 2.09 10*3/MM3 (ref 0.7–3.1)
LYMPHOCYTES NFR BLD AUTO: 33.5 % (ref 19.6–45.3)
MCH RBC QN AUTO: 29.7 PG (ref 26.6–33)
MCHC RBC AUTO-ENTMCNC: 33.2 G/DL (ref 31.5–35.7)
MCV RBC AUTO: 89.6 FL (ref 79–97)
MONOCYTES # BLD AUTO: 0.65 10*3/MM3 (ref 0.1–0.9)
MONOCYTES NFR BLD AUTO: 10.4 % (ref 5–12)
NEUTROPHILS NFR BLD AUTO: 3.15 10*3/MM3 (ref 1.7–7)
NEUTROPHILS NFR BLD AUTO: 50.6 % (ref 42.7–76)
NRBC BLD AUTO-RTO: 0 /100 WBC (ref 0–0.2)
PLATELET # BLD AUTO: 447 10*3/MM3 (ref 140–450)
PMV BLD AUTO: 10.6 FL (ref 6–12)
POTASSIUM SERPL-SCNC: 4.8 MMOL/L (ref 3.5–5.2)
PROT SERPL-MCNC: 7.4 G/DL (ref 6–8.5)
RBC # BLD AUTO: 4.98 10*6/MM3 (ref 3.77–5.28)
SODIUM SERPL-SCNC: 142 MMOL/L (ref 136–145)
WBC # BLD AUTO: 6.23 10*3/MM3 (ref 3.4–10.8)

## 2021-06-18 PROCEDURE — 99214 OFFICE O/P EST MOD 30 MIN: CPT | Performed by: NURSE PRACTITIONER

## 2021-06-18 PROCEDURE — 83036 HEMOGLOBIN GLYCOSYLATED A1C: CPT | Performed by: NURSE PRACTITIONER

## 2021-06-18 PROCEDURE — 85025 COMPLETE CBC W/AUTO DIFF WBC: CPT | Performed by: NURSE PRACTITIONER

## 2021-06-18 PROCEDURE — 80053 COMPREHEN METABOLIC PANEL: CPT | Performed by: NURSE PRACTITIONER

## 2021-06-18 RX ORDER — LISINOPRIL 10 MG/1
20 TABLET ORAL DAILY
Qty: 30 TABLET | Refills: 5 | Status: SHIPPED | OUTPATIENT
Start: 2021-06-18 | End: 2023-01-18

## 2021-06-18 NOTE — ASSESSMENT & PLAN NOTE
Patient's depression is single episode and is mild without psychosis. Their depression is currently active and the condition is unchanged. This will be reassessed at the next regular appointment.

## 2021-06-18 NOTE — PROGRESS NOTES
"Chief Complaint  Follow-up (3 month)    Subjective          Beba Ambrose presents to [facility]  History of Present Illness  She presents for 3-month follow-up on diabetes.    Diabetes type 2, non-insulin-dependent: Her last A1c was 6.4% on 3/18/2021, she is currently stable on Januvia 50 mg daily.    History of ADHD: She was referred to psychiatry AJ Carver for Adderall.  She is wanting a new referral, she is not currently happy with her current psychiatrist.  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  She has been off of Adderall for a few months now because she did not want to go back to her current psychiatrist.    Hypertension: Her blood pressure is high today at 162/92, she is currently on lisinopril 10 mg daily.  She does not check her blood pressure at home. Blood pressure was rechecked during visit and was 152/76.    Depression: She is currently stable on Zoloft 100 mg daily.    Asthma: She is currently stable on medications as listed.  She states she did not get her Zostavax vaccine yet and she also has not received her Covid vaccine yet.  I did advise her not to get these vaccines at the same time.    Patient was also seen by Sanjuanita Elizabeth, NP student.  Objective   Vital Signs:   /76 (BP Location: Left arm, Patient Position: Sitting, Cuff Size: Adult)   Pulse 98   Temp 97.5 °F (36.4 °C)   Ht 157.5 cm (62\")   Wt 71.5 kg (157 lb 9.6 oz)   SpO2 100%   BMI 28.83 kg/m²     Physical Exam  Vitals reviewed.   Constitutional:       Appearance: Normal appearance. She is well-developed.   Neck:      Thyroid: No thyroid mass, thyromegaly or thyroid tenderness.   Cardiovascular:      Rate and Rhythm: Normal rate and regular rhythm.      Heart sounds: No murmur heard.   No friction rub. No gallop.    Pulmonary:      Effort: Pulmonary effort is normal.      Breath sounds: Normal breath sounds. No wheezing or rhonchi.   Lymphadenopathy:      " Cervical: No cervical adenopathy.   Skin:     General: Skin is warm and dry.   Neurological:      Mental Status: She is alert and oriented to person, place, and time.      Cranial Nerves: No cranial nerve deficit.   Psychiatric:         Mood and Affect: Mood and affect normal.         Behavior: Behavior normal.         Thought Content: Thought content normal. Thought content does not include homicidal or suicidal ideation.         Judgment: Judgment normal.        Result Review :                 Assessment and Plan    Diagnoses and all orders for this visit:    1. Type 2 diabetes mellitus without complication, without long-term current use of insulin (CMS/[facility]) (Primary)  Assessment & Plan:  Diabetes is unchanged.   Continue current treatment regimen.  Diabetes will be reassessed in 6 months.    Orders:  -     Hemoglobin A1c  -     Comprehensive Metabolic Panel  -     CBC & Differential    2. Essential hypertension  Assessment & Plan:  Blood pressure is worsening today, we will increase her lisinopril dose to 20 mg daily.  We will have her follow-up in 1 month for recheck.  We will also plan to do her Pap smear at that time.    Orders:  -     Comprehensive Metabolic Panel  -     CBC & Differential    3. Depression, unspecified depression type  Assessment & Plan:  Patient's depression is single episode and is mild without psychosis. Their depression is currently active and the condition is unchanged. This will be reassessed at the next regular appointment.       4. Asthma, unspecified asthma severity, unspecified whether complicated, unspecified whether persistent  Assessment & Plan:  Asthma is unchanged.  The patient is experiencing no daytime asthma symptoms. She is experiencing no nighttime asthma symptoms.  Continue current medications.    Orders:  -     CBC & Differential    5. Attention deficit hyperactivity disorder (ADHD), unspecified ADHD type  Assessment & Plan:  Psychological condition is  worsening.  Referral to psychiatry.  Psychological condition  will be reassessed at the next regular appointment.    Orders:  -     Ambulatory Referral to Psychiatry    Other orders  -     lisinopril (PRINIVIL,ZESTRIL) 10 MG tablet; Take 2 tablets by mouth Daily.  Dispense: 30 tablet; Refill: 5      Follow Up   Return in about 4 weeks (around 7/16/2021) for Pap smear.  Patient was given instructions and counseling regarding her condition or for health maintenance advice. Please see specific information pulled into the AVS if appropriate.

## 2021-06-18 NOTE — ASSESSMENT & PLAN NOTE
Blood pressure is worsening today, we will increase her lisinopril dose to 20 mg daily.  We will have her follow-up in 1 month for recheck.  We will also plan to do her Pap smear at that time.

## 2021-06-18 NOTE — ASSESSMENT & PLAN NOTE
Asthma is unchanged.  The patient is experiencing no daytime asthma symptoms. She is experiencing no nighttime asthma symptoms.  Continue current medications.

## 2021-06-19 LAB — HBA1C MFR BLD: 6.2 % (ref 4.8–5.6)

## 2021-06-21 ENCOUNTER — TELEPHONE (OUTPATIENT)
Dept: FAMILY MEDICINE CLINIC | Facility: CLINIC | Age: 51
End: 2021-06-21

## 2021-06-21 NOTE — TELEPHONE ENCOUNTER
Caller: Beba Ambrose    Relationship to patient: Self    Best call back number: [phone]     Patient is needing: PATIENT CALLED REGARDING HER TEST RESULTS, PLEASE CALL BACK THANK YOU.    UNABLE TO WARM TRANSFER.

## 2021-07-20 ENCOUNTER — OFFICE VISIT (OUTPATIENT)
Dept: FAMILY MEDICINE CLINIC | Facility: CLINIC | Age: 51
End: 2021-07-20

## 2021-07-20 VITALS
WEIGHT: 154.4 LBS | SYSTOLIC BLOOD PRESSURE: 132 MMHG | OXYGEN SATURATION: 95 % | BODY MASS INDEX: 28.41 KG/M2 | HEART RATE: 101 BPM | HEIGHT: 62 IN | TEMPERATURE: 97.1 F | DIASTOLIC BLOOD PRESSURE: 78 MMHG

## 2021-07-20 DIAGNOSIS — E11.9 TYPE 2 DIABETES MELLITUS WITHOUT COMPLICATION, WITHOUT LONG-TERM CURRENT USE OF INSULIN (HCC): ICD-10-CM

## 2021-07-20 DIAGNOSIS — J45.909 ASTHMA, UNSPECIFIED ASTHMA SEVERITY, UNSPECIFIED WHETHER COMPLICATED, UNSPECIFIED WHETHER PERSISTENT: ICD-10-CM

## 2021-07-20 DIAGNOSIS — Z97.5 IUD (INTRAUTERINE DEVICE) IN PLACE: ICD-10-CM

## 2021-07-20 DIAGNOSIS — Z12.4 SCREENING FOR CERVICAL CANCER: ICD-10-CM

## 2021-07-20 DIAGNOSIS — Z00.00 ENCOUNTER FOR ANNUAL PHYSICAL EXAM: Primary | ICD-10-CM

## 2021-07-20 DIAGNOSIS — I10 ESSENTIAL HYPERTENSION: ICD-10-CM

## 2021-07-20 DIAGNOSIS — R87.619 ABNORMAL CERVICAL PAPANICOLAOU SMEAR, UNSPECIFIED ABNORMAL PAP FINDING: ICD-10-CM

## 2021-07-20 PROCEDURE — 99214 OFFICE O/P EST MOD 30 MIN: CPT | Performed by: NURSE PRACTITIONER

## 2021-07-20 PROCEDURE — G0148 SCR C/V CYTO, AUTOSYS, RESCR: HCPCS | Performed by: NURSE PRACTITIONER

## 2021-07-20 PROCEDURE — 87624 HPV HI-RISK TYP POOLED RSLT: CPT | Performed by: NURSE PRACTITIONER

## 2021-07-20 RX ORDER — PREDNISONE 20 MG/1
TABLET ORAL
Qty: 12 TABLET | Refills: 0 | Status: SHIPPED | OUTPATIENT
Start: 2021-07-20 | End: 2021-07-26

## 2021-07-20 NOTE — PROGRESS NOTES
"Chief Complaint  Gynecologic Exam    Subjective          Beba Ambrose presents to [facility]  History of Present Illness  She presents today for an annual physical with Pap smear.    She states she has an IUD that she states is almost due to be changed.  She does not have menstrual cycles due to the IUD.  She does not have any concerns for STDs.    Hypertension: Her previous blood pressure was uncontrolled and we increased lisinopril dose to 20 mg last month.  Her blood pressure is much improved today at 132/78.    Diabetes type 2, non-insulin-dependent: Labs were done last month and her A1c was stable at 6.2% on Januvia 50 mg daily.    She has a history of asthma and a former smoker.  She states she has been having some wheezing and congestion lately and thinks she may need some prednisone.    Patient was also seen by Sanjuanita Elizabeth, NP student.    Objective   Vital Signs:   /78   Pulse 101   Temp 97.1 °F (36.2 °C)   Ht 157.5 cm (62\")   Wt 70 kg (154 lb 6.4 oz)   SpO2 95%   BMI 28.24 kg/m²     Physical Exam  Vitals reviewed. Exam conducted with a chaperone present.   Constitutional:       General: She is not in acute distress.     Appearance: Normal appearance. She is well-developed. She is not diaphoretic.   HENT:      Head: Normocephalic and atraumatic. Hair is normal.   Eyes:      General: Lids are normal.      Pupils: Pupils are equal, round, and reactive to light.   Neck:      Thyroid: No thyromegaly.      Vascular: No JVD.   Cardiovascular:      Rate and Rhythm: Normal rate and regular rhythm.      Pulses: Normal pulses.      Heart sounds: Normal heart sounds. No murmur heard.   No friction rub. No gallop.    Pulmonary:      Effort: Pulmonary effort is normal. No respiratory distress.      Breath sounds: Normal breath sounds. No wheezing or rales.   Chest:      Chest wall: No tenderness.      Breasts: Breasts are symmetrical.         Right: Normal. No mass, nipple " discharge, skin change or tenderness.         Left: Normal. No mass, nipple discharge, skin change or tenderness.   Abdominal:      General: There is no distension.      Palpations: Abdomen is soft. There is no mass.      Tenderness: There is no abdominal tenderness.   Genitourinary:     General: Normal vulva.      Urethra: No urethral swelling.      Vagina: Normal. No vaginal discharge or lesions.      Cervix: No cervical motion tenderness, discharge, friability, lesion, erythema or cervical bleeding.      Uterus: Normal. Not deviated, not enlarged and not tender.       Adnexa: Right adnexa normal and left adnexa normal.        Right: No tenderness.          Left: No mass.        Rectum: Normal.   Musculoskeletal:         General: No tenderness or deformity. Normal range of motion.      Cervical back: Normal range of motion and neck supple.   Lymphadenopathy:      Cervical: No cervical adenopathy.      Upper Body:      Right upper body: No axillary adenopathy.      Left upper body: No axillary adenopathy.   Skin:     General: Skin is warm and dry.      Findings: No erythema or rash.   Neurological:      Mental Status: She is alert and oriented to person, place, and time.      Cranial Nerves: No cranial nerve deficit.      Motor: No abnormal muscle tone.      Coordination: Coordination normal.      Deep Tendon Reflexes: Reflexes are normal and symmetric. Reflexes normal.   Psychiatric:         Mood and Affect: Mood normal.         Behavior: Behavior normal.         Thought Content: Thought content normal.         Judgment: Judgment normal.        Result Review :                 Assessment and Plan    Diagnoses and all orders for this visit:    1. Encounter for annual physical exam (Primary)  -     Cancel: Liquid-based Pap Smear, Screening; Future  -     Cancel: Liquid-based Pap Smear, Screening  -     Liquid-based Pap Smear, Screening    2. Screening for cervical cancer  -     Cancel: Liquid-based Pap Smear,  Screening; Future  -     Cancel: Liquid-based Pap Smear, Screening  -     Liquid-based Pap Smear, Screening    3. Essential hypertension  Assessment & Plan:  Hypertension is improving with treatment.  Continue current treatment regimen.  Continue current medications.  Blood pressure will be reassessed at the next regular appointment.      4. Type 2 diabetes mellitus without complication, without long-term current use of insulin (CMS/[facility])  Assessment & Plan:  Diabetes is improving with treatment.   Continue current treatment regimen.  Diabetes will be reassessed in 6 months.      5. Asthma, unspecified asthma severity, unspecified whether complicated, unspecified whether persistent  Assessment & Plan:  Asthma is worsening.  The patient is experiencing frequent daytime asthma symptoms. She is experiencing frequent nighttime asthma symptoms.  Medications: Prednisone Dosepak.  Advised to call or follow-up if not improving.          Other orders  -     predniSONE (DELTASONE) 20 MG tablet; Take 3 tablets by mouth Daily for 2 days, THEN 2 tablets Daily for 2 days, THEN 1 tablet Daily for 2 days.  Dispense: 12 tablet; Refill: 0      Follow Up   Return in about 6 months (around 1/20/2022) for Next scheduled follow up.  Patient was given instructions and counseling regarding her condition or for health maintenance advice. Please see specific information pulled into the AVS if appropriate.

## 2021-07-20 NOTE — ASSESSMENT & PLAN NOTE
Asthma is worsening.  The patient is experiencing frequent daytime asthma symptoms. She is experiencing frequent nighttime asthma symptoms.  Medications: Prednisone Dosepak.  Advised to call or follow-up if not improving.

## 2021-07-29 LAB
CONV .: ABNORMAL
CYTOLOGIST CVX/VAG CYTO: ABNORMAL
CYTOLOGY CVX/VAG DOC CYTO: ABNORMAL
CYTOLOGY CVX/VAG DOC THIN PREP: ABNORMAL
DX ICD CODE: ABNORMAL
DX ICD CODE: ABNORMAL
HIV 1 & 2 AB SER-IMP: ABNORMAL
HPV I/H RISK 4 DNA CVX QL PROBE+SIG AMP: POSITIVE
OTHER STN SPEC: ABNORMAL
PATHOLOGIST CVX/VAG CYTO: ABNORMAL
RECOM F/U CVX/VAG CYTO: ABNORMAL
STAT OF ADQ CVX/VAG CYTO-IMP: ABNORMAL

## 2021-08-21 DIAGNOSIS — J45.31 MILD PERSISTENT ASTHMA WITH (ACUTE) EXACERBATION: ICD-10-CM

## 2021-08-23 RX ORDER — BUDESONIDE AND FORMOTEROL FUMARATE DIHYDRATE 160; 4.5 UG/1; UG/1
AEROSOL RESPIRATORY (INHALATION)
Qty: 10.2 G | Refills: 2 | Status: SHIPPED | OUTPATIENT
Start: 2021-08-23 | End: 2022-02-07

## 2021-08-23 RX ORDER — SITAGLIPTIN 50 MG/1
TABLET, FILM COATED ORAL
Qty: 30 TABLET | Refills: 3 | Status: SHIPPED | OUTPATIENT
Start: 2021-08-23 | End: 2022-02-07

## 2021-12-21 RX ORDER — SERTRALINE HYDROCHLORIDE 100 MG/1
100 TABLET, FILM COATED ORAL DAILY
Qty: 30 TABLET | Refills: 0 | Status: SHIPPED | OUTPATIENT
Start: 2021-12-21 | End: 2022-01-26 | Stop reason: SDUPTHER

## 2022-01-26 ENCOUNTER — OFFICE VISIT (OUTPATIENT)
Dept: FAMILY MEDICINE CLINIC | Facility: CLINIC | Age: 52
End: 2022-01-26

## 2022-01-26 VITALS
WEIGHT: 147.8 LBS | BODY MASS INDEX: 27.2 KG/M2 | HEART RATE: 99 BPM | TEMPERATURE: 98.5 F | SYSTOLIC BLOOD PRESSURE: 144 MMHG | HEIGHT: 62 IN | DIASTOLIC BLOOD PRESSURE: 88 MMHG | OXYGEN SATURATION: 93 %

## 2022-01-26 DIAGNOSIS — J45.20 MILD INTERMITTENT ASTHMA, UNSPECIFIED WHETHER COMPLICATED: ICD-10-CM

## 2022-01-26 DIAGNOSIS — N95.1 HOT FLASHES DUE TO MENOPAUSE: ICD-10-CM

## 2022-01-26 DIAGNOSIS — R87.619 ABNORMAL CERVICAL PAPANICOLAOU SMEAR, UNSPECIFIED ABNORMAL PAP FINDING: ICD-10-CM

## 2022-01-26 DIAGNOSIS — E11.9 TYPE 2 DIABETES MELLITUS WITHOUT COMPLICATION, WITHOUT LONG-TERM CURRENT USE OF INSULIN: Primary | ICD-10-CM

## 2022-01-26 DIAGNOSIS — F32.1 CURRENT MODERATE EPISODE OF MAJOR DEPRESSIVE DISORDER WITHOUT PRIOR EPISODE: ICD-10-CM

## 2022-01-26 DIAGNOSIS — I10 ESSENTIAL HYPERTENSION: ICD-10-CM

## 2022-01-26 LAB
ALBUMIN SERPL-MCNC: 4.6 G/DL (ref 3.5–5.2)
ALBUMIN UR-MCNC: 3.3 MG/DL
ALBUMIN/GLOB SERPL: 2.2 G/DL
ALP SERPL-CCNC: 85 U/L (ref 39–117)
ALT SERPL W P-5'-P-CCNC: 31 U/L (ref 1–33)
ANION GAP SERPL CALCULATED.3IONS-SCNC: 11.6 MMOL/L (ref 5–15)
AST SERPL-CCNC: 31 U/L (ref 1–32)
BILIRUB SERPL-MCNC: 0.4 MG/DL (ref 0–1.2)
BUN SERPL-MCNC: 12 MG/DL (ref 6–20)
BUN/CREAT SERPL: 24 (ref 7–25)
CALCIUM SPEC-SCNC: 9.2 MG/DL (ref 8.6–10.5)
CHLORIDE SERPL-SCNC: 109 MMOL/L (ref 98–107)
CHOLEST SERPL-MCNC: 150 MG/DL (ref 0–200)
CO2 SERPL-SCNC: 23.4 MMOL/L (ref 22–29)
CREAT SERPL-MCNC: 0.5 MG/DL (ref 0.57–1)
CREAT UR-MCNC: 169.5 MG/DL
GFR SERPL CREATININE-BSD FRML MDRD: 131 ML/MIN/1.73
GLOBULIN UR ELPH-MCNC: 2.1 GM/DL
GLUCOSE SERPL-MCNC: 110 MG/DL (ref 65–99)
HBA1C MFR BLD: 6.75 % (ref 4.8–5.6)
HDLC SERPL-MCNC: 74 MG/DL (ref 40–60)
LDLC SERPL CALC-MCNC: 64 MG/DL (ref 0–100)
LDLC/HDLC SERPL: 0.88 {RATIO}
MICROALBUMIN/CREAT UR: 19.5 MG/G
POTASSIUM SERPL-SCNC: 4.4 MMOL/L (ref 3.5–5.2)
PROT SERPL-MCNC: 6.7 G/DL (ref 6–8.5)
SODIUM SERPL-SCNC: 144 MMOL/L (ref 136–145)
T4 FREE SERPL-MCNC: 1.41 NG/DL (ref 0.93–1.7)
TRIGL SERPL-MCNC: 56 MG/DL (ref 0–150)
TSH SERPL DL<=0.05 MIU/L-ACNC: 0.99 UIU/ML (ref 0.27–4.2)
VLDLC SERPL-MCNC: 12 MG/DL (ref 5–40)

## 2022-01-26 PROCEDURE — 80053 COMPREHEN METABOLIC PANEL: CPT | Performed by: NURSE PRACTITIONER

## 2022-01-26 PROCEDURE — 82570 ASSAY OF URINE CREATININE: CPT | Performed by: NURSE PRACTITIONER

## 2022-01-26 PROCEDURE — 80061 LIPID PANEL: CPT | Performed by: NURSE PRACTITIONER

## 2022-01-26 PROCEDURE — 84439 ASSAY OF FREE THYROXINE: CPT | Performed by: NURSE PRACTITIONER

## 2022-01-26 PROCEDURE — 82043 UR ALBUMIN QUANTITATIVE: CPT | Performed by: NURSE PRACTITIONER

## 2022-01-26 PROCEDURE — 84443 ASSAY THYROID STIM HORMONE: CPT | Performed by: NURSE PRACTITIONER

## 2022-01-26 PROCEDURE — 99214 OFFICE O/P EST MOD 30 MIN: CPT | Performed by: NURSE PRACTITIONER

## 2022-01-26 PROCEDURE — 83036 HEMOGLOBIN GLYCOSYLATED A1C: CPT | Performed by: NURSE PRACTITIONER

## 2022-01-26 RX ORDER — ALBUTEROL SULFATE 1.25 MG/3ML
1 SOLUTION RESPIRATORY (INHALATION) EVERY 6 HOURS PRN
Qty: 60 EACH | Refills: 12 | Status: SHIPPED | OUTPATIENT
Start: 2022-01-26

## 2022-01-26 RX ORDER — SERTRALINE HYDROCHLORIDE 100 MG/1
100 TABLET, FILM COATED ORAL DAILY
Qty: 90 TABLET | Refills: 3 | Status: SHIPPED | OUTPATIENT
Start: 2022-01-26 | End: 2022-09-07

## 2022-01-26 NOTE — ASSESSMENT & PLAN NOTE
Asthma is unchanged.  The patient is experiencing no daytime asthma symptoms. She is experiencing no nighttime asthma symptoms.  Discussed monitoring symptoms and use of quick-relief medications and contacting us early in the course of exacerbations.  Warning signs of respiratory distress were reviewed with the patient.   Follow up in 6 months, or sooner should new symptoms or problems arise.

## 2022-01-26 NOTE — ASSESSMENT & PLAN NOTE
Diabetes is improving with treatment.   Discussed foot care.  Reminded to get yearly retinal exam.  Diabetes will be reassessed in 6 months.

## 2022-01-26 NOTE — ASSESSMENT & PLAN NOTE
Hypertension is unchanged.  Continue current treatment regimen.  Continue current medications.  Blood pressure will be reassessed at the next regular appointment.

## 2022-01-26 NOTE — PROGRESS NOTES
"Chief Complaint  6 month follow up, Diabetes, Hypertension, and Depression    Subjective          Beba Ambrose presents to [facility]  History of Present Illness   51-year-old female presents today for 6-month follow-up.    Diabetes type 2, non-insulin-dependent: Her last A1c was 6.2% on 6/18/2021. She is currently on Januvia 50 mg daily.    Hypertension: Blood pressure stable 144/88 on lisinopril 10 mg daily.    Asthma: She uses albuterol inhaler or nebulizer as needed. Her O2 sats were noted to be in the 9% but she denies any shortness of breath or feeling bad. We rechecked her O2 sats and they were 93%. She does have on nail polish.    Depression/anxiety: She is stable on Zoloft 100 mg daily and needs a refill.    She had an abnormal Pap smear last year and was referred to gynecology. She states she never did get an appointment. She also is complaining of night sweats and hot flashes.    A mammogram was ordered last year but she did not make her appointment. I gave her the number today to reschedule her mammogram.  Objective   Vital Signs:   /88   Pulse 99   Temp 98.5 °F (36.9 °C)   Ht 157.5 cm (62\")   Wt 67 kg (147 lb 12.8 oz)   SpO2 93%   BMI 27.03 kg/m²     Physical Exam  Vitals reviewed.   Constitutional:       Appearance: Normal appearance. She is well-developed.   Neck:      Thyroid: No thyroid mass, thyromegaly or thyroid tenderness.   Cardiovascular:      Rate and Rhythm: Normal rate and regular rhythm.      Heart sounds: No murmur heard.  No friction rub. No gallop.    Pulmonary:      Effort: Pulmonary effort is normal.      Breath sounds: Normal breath sounds. No wheezing or rhonchi.   Lymphadenopathy:      Cervical: No cervical adenopathy.   Skin:     General: Skin is warm and dry.   Neurological:      Mental Status: She is alert and oriented to person, place, and time.      Cranial Nerves: No cranial nerve deficit.   Psychiatric:         Mood and Affect: " Mood and affect normal.         Behavior: Behavior normal.         Thought Content: Thought content normal. Thought content does not include homicidal or suicidal ideation.         Judgment: Judgment normal.        Result Review :     CMP    CMP 3/17/21 6/18/21   Glucose 87 96   BUN 13 23 (A)   Creatinine 0.59 0.57   eGFR Non African Am  112   Sodium 147 142   Potassium 3.9 4.8   Chloride 108 109 (A)   Calcium 8.9 9.6   Albumin 4.6 4.70   Total Bilirubin <0.15 (A) 0.2   Alkaline Phosphatase 115 (A) 112   AST (SGOT) 31 36 (A)   ALT (SGPT) 32 34 (A)   (A) Abnormal value            CBC w/diff    CBC w/Diff 3/17/21 6/18/21   WBC 8.37 6.23   RBC 4.81 4.98   Hemoglobin 14.4 14.8   Hematocrit 46.5 44.6   MCV 96.7 89.6   MCH 29.9 29.7   MCHC 31.0 (A) 33.2   RDW 13.8 13.6   Platelets 458 (A) 447   Neutrophil Rel % 60.3 50.6   Immature Granulocyte Rel %  0.5   Lymphocyte Rel % 24.3 33.5   Monocyte Rel % 11.1 (A) 10.4   Eosinophil Rel % 3.1 4.0   Basophil Rel % 0.6 1.0   (A) Abnormal value            Lipid Panel    Lipid Panel 3/17/21   Total Cholesterol 140   Triglycerides 78   HDL Cholesterol 76 (A)   VLDL Cholesterol 16   LDL Cholesterol  48 (A)   (A) Abnormal value       Comments are available for some flowsheets but are not being displayed.           A1C Last 3 Results    HGBA1C Last 3 Results 3/17/21 6/18/21   Hemoglobin A1C 6.4 (A) 6.20 (A)   (A) Abnormal value       Comments are available for some flowsheets but are not being displayed.                         Assessment and Plan    Diagnoses and all orders for this visit:    1. Type 2 diabetes mellitus without complication, without long-term current use of insulin (HCC) (Primary)  Assessment & Plan:  Diabetes is improving with treatment.   Discussed foot care.  Reminded to get yearly retinal exam.  Diabetes will be reassessed in 6 months.    Orders:  -     Hemoglobin A1c  -     Comprehensive Metabolic Panel  -     Lipid Panel  -     TSH+Free T4  -     Microalbumin /  Creatinine Urine Ratio - Urine, Clean Catch    2. Essential hypertension  Assessment & Plan:  Hypertension is unchanged.  Continue current treatment regimen.  Continue current medications.  Blood pressure will be reassessed at the next regular appointment.    Orders:  -     Comprehensive Metabolic Panel  -     Lipid Panel    3. Current moderate episode of major depressive disorder without prior episode (HCC)  Assessment & Plan:  Patient's depression is single episode and is moderate without psychosis. Their depression is currently active and the condition is improving with treatment. This will be reassessed at the next regular appointment. F/U as described:patient will continue current medication therapy.      4. Mild intermittent asthma, unspecified whether complicated  Assessment & Plan:  Asthma is unchanged.  The patient is experiencing no daytime asthma symptoms. She is experiencing no nighttime asthma symptoms.  Discussed monitoring symptoms and use of quick-relief medications and contacting us early in the course of exacerbations.  Warning signs of respiratory distress were reviewed with the patient.   Follow up in 6 months, or sooner should new symptoms or problems arise.          5. Abnormal cervical Papanicolaou smear, unspecified abnormal pap finding  -     Ambulatory Referral to Obstetrics / Gynecology    6. Hot flashes due to menopause  Assessment & Plan:  I recommended that she start over-the-counter vitamin E 400 units twice daily and black cohosh.      Other orders  -     Zoster Vac Recomb Adjuvanted 50 MCG/0.5ML reconstituted suspension; Inject 0.5 mL into the appropriate muscle as directed by prescriber Daily.  Dispense: 1 each; Refill: 1  -     albuterol (ACCUNEB) 1.25 MG/3ML nebulizer solution; Take 3 mL by nebulization Every 6 (Six) Hours As Needed for Wheezing or Shortness of Air.  Dispense: 60 each; Refill: 12  -     sertraline (ZOLOFT) 100 MG tablet; Take 1 tablet by mouth Daily.  Dispense: 90  tablet; Refill: 3      Follow Up   Return in about 6 months (around 7/26/2022) for Next scheduled follow up diabetes.  Patient was given instructions and counseling regarding her condition or for health maintenance advice. Please see specific information pulled into the AVS if appropriate.

## 2022-02-05 DIAGNOSIS — J45.31 MILD PERSISTENT ASTHMA WITH (ACUTE) EXACERBATION: ICD-10-CM

## 2022-02-07 RX ORDER — ALBUTEROL SULFATE 90 UG/1
AEROSOL, METERED RESPIRATORY (INHALATION)
Qty: 18 G | Refills: 2 | Status: SHIPPED | OUTPATIENT
Start: 2022-02-07 | End: 2022-06-10

## 2022-02-07 RX ORDER — BUDESONIDE AND FORMOTEROL FUMARATE DIHYDRATE 160; 4.5 UG/1; UG/1
AEROSOL RESPIRATORY (INHALATION)
Qty: 10.2 G | Refills: 2 | Status: SHIPPED | OUTPATIENT
Start: 2022-02-07 | End: 2022-06-10

## 2022-02-07 RX ORDER — SITAGLIPTIN 50 MG/1
TABLET, FILM COATED ORAL
Qty: 30 TABLET | Refills: 2 | Status: SHIPPED | OUTPATIENT
Start: 2022-02-07 | End: 2022-06-10

## 2022-02-08 ENCOUNTER — TELEMEDICINE (OUTPATIENT)
Dept: FAMILY MEDICINE CLINIC | Facility: CLINIC | Age: 52
End: 2022-02-08

## 2022-02-08 ENCOUNTER — TELEPHONE (OUTPATIENT)
Dept: FAMILY MEDICINE CLINIC | Facility: CLINIC | Age: 52
End: 2022-02-08

## 2022-02-08 DIAGNOSIS — F41.9 ANXIETY: ICD-10-CM

## 2022-02-08 DIAGNOSIS — J45.20 MILD INTERMITTENT ASTHMA, UNSPECIFIED WHETHER COMPLICATED: Primary | ICD-10-CM

## 2022-02-08 DIAGNOSIS — I10 ESSENTIAL HYPERTENSION: ICD-10-CM

## 2022-02-08 DIAGNOSIS — E11.9 TYPE 2 DIABETES MELLITUS WITHOUT COMPLICATION, WITHOUT LONG-TERM CURRENT USE OF INSULIN: ICD-10-CM

## 2022-02-08 DIAGNOSIS — F32.1 CURRENT MODERATE EPISODE OF MAJOR DEPRESSIVE DISORDER WITHOUT PRIOR EPISODE: ICD-10-CM

## 2022-02-08 PROCEDURE — 99214 OFFICE O/P EST MOD 30 MIN: CPT | Performed by: NURSE PRACTITIONER

## 2022-02-08 NOTE — TELEPHONE ENCOUNTER
Hub staff attempted to follow warm transfer process and was unsuccessful     Caller: Beba Ambrose    Relationship to patient: Self    Best call back number: [phone]    Patient is needing: THE PATIENT IS RETURNING A MISSED CALL FROM [employer]. SHE STATED EITHER TELEHEALTH TIMES WORKS FOR HER AS WELL

## 2022-02-08 NOTE — ASSESSMENT & PLAN NOTE
Asthma is well controlled, uses albuterol inhaler as needed.  She does complain of having difficulty wearing a mask for long periods of time.  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

## 2022-02-08 NOTE — PROGRESS NOTES
Mode of Visit: Video  Location of patient: home  You have chosen to receive care through a telehealth visit.  Does the patient consent to use a video/audio connection for your medical care today? Yes  The visit included audio and video interaction. No technical issues occurred during this visit.     Chief Complaint  reasonable accomodation paperwork fo rwork    Subjective          Beba Ambrose presents to [facility]  History of Present Illness   51-year-old female presents today to discuss reasonable accommodation request.  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  She has a history of asthma which makes it difficult for her to wear a mask all day long.  She has a history of diabetes type 2, non-insulin-dependent and hypertension that is well controlled but puts her at higher risk for any complications if she becomes ill with Covid.  She also has a history of depression and anxiety which causes her to panic when she is unable to socially distance.  She states she is an only parent and worries about her children.  Objective   Vital Signs:   There were no vitals taken for this visit.    Physical Exam   Constitutional: She appears well-developed and well-nourished.   HENT:   Head: Normocephalic.   Neck: Neck normal appearance.  Pulmonary/Chest: Effort normal.   Neurological: She is alert.   Psychiatric: She has a normal mood and affect.     Result Review :                 Assessment and Plan    Diagnoses and all orders for this visit:    1. Mild intermittent asthma, unspecified whether complicated (Primary)  Assessment & Plan:  Asthma is well controlled, uses albuterol inhaler as needed.  She does complain of having difficulty wearing a mask for long periods of time.  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          2. Type 2 diabetes  mellitus without complication, without long-term current use of insulin (HCC)  Assessment & Plan:  Diabetes is controlled on Januvia.      3. Essential hypertension  Assessment & Plan:  Blood pressure is controlled on lisinopril.      4. Anxiety  Assessment & Plan:  Her anxiety is controlled on Zoloft but she does get anxious and worries about getting Covid and having complications.  She states she is an only parent.      5. Current moderate episode of major depressive disorder without prior episode ([facility])  Assessment & Plan:  Patient's depression is single episode and is moderate without psychosis. Their depression is currently active and the condition is improving with treatment. This will be reassessed at the next regular appointment. F/U as described:patient will continue current medication therapy.    Paperwork completed for reasonable accommodation request.  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    Follow Up   Return in about 5 months (around 7/8/2022) for Next scheduled follow up.  Patient was given instructions and counseling regarding her condition or for health maintenance advice. Please see specific information pulled into the AVS if appropriate.

## 2022-02-08 NOTE — ASSESSMENT & PLAN NOTE
Her anxiety is controlled on Zoloft but she does get anxious and worries about getting Covid and having complications.  She states she is an only parent.

## 2022-06-08 DIAGNOSIS — J45.31 MILD PERSISTENT ASTHMA WITH (ACUTE) EXACERBATION: ICD-10-CM

## 2022-06-10 RX ORDER — ALBUTEROL SULFATE 90 UG/1
AEROSOL, METERED RESPIRATORY (INHALATION)
Qty: 18 G | Refills: 2 | Status: SHIPPED | OUTPATIENT
Start: 2022-06-10 | End: 2022-10-31

## 2022-06-10 RX ORDER — BUDESONIDE AND FORMOTEROL FUMARATE DIHYDRATE 160; 4.5 UG/1; UG/1
AEROSOL RESPIRATORY (INHALATION)
Qty: 10.2 G | Refills: 2 | Status: SHIPPED | OUTPATIENT
Start: 2022-06-10 | End: 2022-10-31

## 2022-06-10 RX ORDER — SITAGLIPTIN 50 MG/1
TABLET, FILM COATED ORAL
Qty: 90 TABLET | Refills: 0 | Status: SHIPPED | OUTPATIENT
Start: 2022-06-10 | End: 2022-10-31

## 2022-06-13 PROBLEM — R87.610 ASCUS WITH POSITIVE HIGH RISK HPV CERVICAL: Status: ACTIVE | Noted: 2022-06-13

## 2022-06-13 PROBLEM — R87.810 ASCUS WITH POSITIVE HIGH RISK HPV CERVICAL: Status: ACTIVE | Noted: 2022-06-13

## 2022-06-13 NOTE — PROGRESS NOTES
"GYN new patient    CC: abnormal pap smear    Tobacco/Nicotine use:  No    HPI:   52 y.o. Contraception or HRT: Contraception:  None  Pt has concerns she would like to discuss.      History: PMHx, Meds, Allergies, PSHx, Social Hx, and POBHx all reviewed and updated.  PCP:Margarita Joya APRN      Review of Systems     /91   Pulse 103   Ht 157.5 cm (62\")   Wt 66.5 kg (146 lb 9.6 oz)   SpO2 95%   BMI 26.81 kg/m²     Physical Exam  Vitals and nursing note reviewed. Exam conducted with a chaperone present.   Constitutional:       Appearance: Normal appearance. She is normal weight.   Neurological:      Mental Status: She is alert.   Psychiatric:         Mood and Affect: Mood normal.         Behavior: Behavior normal.         Thought Content: Thought content normal.         Judgment: Judgment normal.         ASSESSMENT AND PLAN:  Problem Visit    Diagnoses and all orders for this visit:    1. ASCUS with positive high risk HPV cervical (Primary)  Assessment & Plan:  States she had an abnormal pap smear in the past and repeat was normal and no colposcopy  Patient was counseled regarding HPV, dysplasia, progression of dysplasia, colposcopy, cervical biopsy and possible endocervical curettage  Patient will schedule a colposcopy        Counseling:     HPV              Follow Up:  Return for colposcopy.        Esme aLmbert MD  2022  "

## 2022-06-14 ENCOUNTER — OFFICE VISIT (OUTPATIENT)
Dept: OBSTETRICS AND GYNECOLOGY | Facility: CLINIC | Age: 52
End: 2022-06-14

## 2022-06-14 VITALS
HEIGHT: 62 IN | HEART RATE: 103 BPM | WEIGHT: 146.6 LBS | SYSTOLIC BLOOD PRESSURE: 154 MMHG | BODY MASS INDEX: 26.98 KG/M2 | DIASTOLIC BLOOD PRESSURE: 91 MMHG | OXYGEN SATURATION: 95 %

## 2022-06-14 DIAGNOSIS — R87.610 ASCUS WITH POSITIVE HIGH RISK HPV CERVICAL: Primary | ICD-10-CM

## 2022-06-14 DIAGNOSIS — R87.810 ASCUS WITH POSITIVE HIGH RISK HPV CERVICAL: Primary | ICD-10-CM

## 2022-06-14 PROCEDURE — 99203 OFFICE O/P NEW LOW 30 MIN: CPT | Performed by: OBSTETRICS & GYNECOLOGY

## 2022-06-14 NOTE — ASSESSMENT & PLAN NOTE
States she had an abnormal pap smear in the past and repeat was normal and no colposcopy  Patient was counseled regarding HPV, dysplasia, progression of dysplasia, colposcopy, cervical biopsy and possible endocervical curettage  Patient will schedule a colposcopy

## 2022-08-08 ENCOUNTER — OFFICE VISIT (OUTPATIENT)
Dept: OBSTETRICS AND GYNECOLOGY | Facility: CLINIC | Age: 52
End: 2022-08-08

## 2022-08-08 VITALS
SYSTOLIC BLOOD PRESSURE: 148 MMHG | BODY MASS INDEX: 26.34 KG/M2 | DIASTOLIC BLOOD PRESSURE: 92 MMHG | WEIGHT: 144 LBS | HEART RATE: 130 BPM

## 2022-08-08 DIAGNOSIS — R87.810 ASCUS WITH POSITIVE HIGH RISK HPV CERVICAL: Primary | ICD-10-CM

## 2022-08-08 DIAGNOSIS — R87.610 ASCUS WITH POSITIVE HIGH RISK HPV CERVICAL: Primary | ICD-10-CM

## 2022-08-08 PROCEDURE — 88305 TISSUE EXAM BY PATHOLOGIST: CPT | Performed by: OBSTETRICS & GYNECOLOGY

## 2022-08-08 PROCEDURE — 57454 BX/CURETT OF CERVIX W/SCOPE: CPT | Performed by: OBSTETRICS & GYNECOLOGY

## 2022-08-08 NOTE — PROGRESS NOTES
Colposcopy    Pregnant: No  Birth control: Mirena IUD  Tobacco/Nicotine use:  No  Pap result: ASCUS, HPV HIGH RISK POSITIVE- non specified type  Uhcg:  Negative  Consent signed: Yes    CC: Presents for colposcopy     Procedure reviewed in detail.  She understands the potential risks include, but are not limited to, pain, bleeding, and infection.  Her questions have been answered.        Subjective:  No complaints    Objective:  /92   Pulse (!) 130   Wt 65.3 kg (144 lb)   BMI 26.34 kg/m²   External genitalia- Without lesion    Perineum- Without lesion  Vagina- Without lesion   Cvx- Adequate 100%TZ seen, AWL 6 and 12:00, Biopsies taken at lesion sites, ECC performed, IUD strings visualized  Acetowhite changes at 12:00 and 6:00  Physical Exam (pics..)    Assessment and Plan:  Diagnoses and all orders for this visit:    1. ASCUS with positive high risk HPV cervical (Primary)  Assessment & Plan:  Colposcopy consistent with ARETHA-1 to ARETHA-2    Orders:  -     Tissue Pathology Exam        Counseling:    We discussed HPV in detail.  Incidence, transmission, course, remission, progression, types, cervical cancer, genital warts, monitoring, and importance of compliance were reviewed.  A HPV handout was provided if she desired.    She understands the need for continued follow up until she is cleared to return to routine screening pap smears.  She understands the importance of follow up and consequences with lack of follow up (i.e. potential for cervical cancer).  Follow up usually ranges every 6months - 12months.      PRECAUTIONS - It is common to have bright red spotting and dark discharge after today.  If she has had cervical biopsies, she is to avoid intercourse or tampons as directed for 7 days.  She can use OTC pain relievers prn.  She needs to return to office or ER (if after hours/weekends) if she has pelvic pain, bleeding > 1 pad/2 hours, discharge that has a bad vaginal odor or vaginal itching, fever > 101.5,  or any other concerns.            Follow Up:  Return in about 2 weeks (around 8/22/2022).      Esme Lambert MD  08/08/2022

## 2022-08-10 ENCOUNTER — TELEPHONE (OUTPATIENT)
Dept: OBSTETRICS AND GYNECOLOGY | Facility: CLINIC | Age: 52
End: 2022-08-10

## 2022-08-10 LAB
CYTO UR: NORMAL
LAB AP CASE REPORT: NORMAL
LAB AP CLINICAL INFORMATION: NORMAL
PATH REPORT.FINAL DX SPEC: NORMAL
PATH REPORT.GROSS SPEC: NORMAL

## 2022-08-10 NOTE — TELEPHONE ENCOUNTER
Left message for the patient to discuss her results and advise her to keep her scheduled appointment.

## 2022-08-10 NOTE — TELEPHONE ENCOUNTER
----- Message from Esme Lamebrt MD sent at 8/10/2022  8:42 AM EDT -----  Please make sure pt has an appointment with me to discuss excisional management

## 2022-08-11 ENCOUNTER — TELEPHONE (OUTPATIENT)
Dept: FAMILY MEDICINE CLINIC | Facility: CLINIC | Age: 52
End: 2022-08-11

## 2022-08-11 NOTE — TELEPHONE ENCOUNTER
Caller: Beba Ambrose    Relationship to patient: Self    Best call back number: [phone]    Date of exposure: OVER THE WEEKEND    Date of positive COVID19 test: 8/11/22    Date if possible COVID19 exposure:     COVID19 symptoms: COUGH, BODY ACHES    Date of initial quarantine: 8/8/22    Additional information or concerns: PATIENT IS WANTING TO KNOW IF THERE IS ANYTHING THAT CAN BE PRESCRIBED FOR HER SYMPTOM.     What is the patients preferred pharmacy: [facility] - [place] - [address] [facility] - [phone]  - [phone] FX  [phone]

## 2022-08-12 DIAGNOSIS — R05.9 COUGH: Primary | ICD-10-CM

## 2022-08-12 RX ORDER — GUAIFENESIN 600 MG/1
1200 TABLET, EXTENDED RELEASE ORAL 2 TIMES DAILY
Qty: 14 TABLET | Refills: 0 | Status: SHIPPED | OUTPATIENT
Start: 2022-08-12 | End: 2022-09-07

## 2022-08-12 RX ORDER — BENZONATATE 100 MG/1
100 CAPSULE ORAL 3 TIMES DAILY PRN
Qty: 21 CAPSULE | Refills: 0 | Status: SHIPPED | OUTPATIENT
Start: 2022-08-12 | End: 2022-09-07

## 2022-08-18 ENCOUNTER — PREP FOR SURGERY (OUTPATIENT)
Dept: OTHER | Facility: HOSPITAL | Age: 52
End: 2022-08-18

## 2022-08-18 DIAGNOSIS — D06.9 SEVERE DYSPLASIA OF CERVIX (CIN III): Primary | ICD-10-CM

## 2022-08-18 RX ORDER — SODIUM CHLORIDE 0.9 % (FLUSH) 0.9 %
1-10 SYRINGE (ML) INJECTION AS NEEDED
Status: CANCELLED | OUTPATIENT
Start: 2022-08-18

## 2022-08-18 RX ORDER — CEFAZOLIN SODIUM 2 G/100ML
2 INJECTION, SOLUTION INTRAVENOUS ONCE
Status: CANCELLED | OUTPATIENT
Start: 2022-08-18 | End: 2022-08-18

## 2022-08-18 RX ORDER — SODIUM CHLORIDE, SODIUM LACTATE, POTASSIUM CHLORIDE, CALCIUM CHLORIDE 600; 310; 30; 20 MG/100ML; MG/100ML; MG/100ML; MG/100ML
125 INJECTION, SOLUTION INTRAVENOUS CONTINUOUS
Status: CANCELLED | OUTPATIENT
Start: 2022-08-18

## 2022-08-18 RX ORDER — SODIUM CHLORIDE 0.9 % (FLUSH) 0.9 %
3 SYRINGE (ML) INJECTION EVERY 12 HOURS SCHEDULED
Status: CANCELLED | OUTPATIENT
Start: 2022-08-18

## 2022-08-18 RX ORDER — ACETAMINOPHEN 500 MG
1000 TABLET ORAL ONCE
Status: CANCELLED | OUTPATIENT
Start: 2022-08-18 | End: 2022-08-18

## 2022-08-18 RX ORDER — SODIUM CHLORIDE 9 MG/ML
40 INJECTION, SOLUTION INTRAVENOUS AS NEEDED
Status: CANCELLED | OUTPATIENT
Start: 2022-08-18

## 2022-08-18 NOTE — PROGRESS NOTES
"GYN Visit    CC: ARETHA-3    HPI:   52 y.o. Contraception or HRT: Contraception:  Mirena IUD  Menses:   q 0 days, lasts 0 days, changes products q 0 hrs on heaviest days.   Pain:  None    Pt has no complaints today.          History: PMHx, Meds, Allergies, PSHx, Social Hx, and POBHx all reviewed and updated.  Physical Exam   PHYSICAL EXAM:  /76   Pulse 97   Ht 157.5 cm (62\")   Wt 66.7 kg (147 lb)   Breastfeeding No   BMI 26.89 kg/m²   General- NAD, alert and oriented, appropriate  Psych- Normal mood, good memory      ASSESSMENT AND PLAN:  Diagnoses and all orders for this visit:    1. Severe dysplasia of cervix (ARETHA III) (Primary)  Assessment & Plan:  Colposcopy biopsy confirms ARETHA-3 with negative ECC  Counseled patient at length regarding the risks benefits cold knife conization of the cervix  All questions were answered and informed consent was obtained  Also counseled the patient that it is most likely her Mirena IUD will be removed at the time of [facility]        Counseling:  Pt was counseled at length regarding the risks and benefits of surgery.  The risks include but are not limited to the risk of anesthesia, the risk of bleeding, the risk of infection, the risk of injury to the bowel, bladder, ureters and any surrounding structures.  Risks also include possibility of needing future surgery to correct an issue as a result of the first surgery.  All questions were answered and informed consent was obtained.        Follow Up:  Return for 2 weeks post op, 2 weeks preop.          Esme Lambert MD  2022        "

## 2022-08-23 ENCOUNTER — OFFICE VISIT (OUTPATIENT)
Dept: OBSTETRICS AND GYNECOLOGY | Facility: CLINIC | Age: 52
End: 2022-08-23

## 2022-08-23 ENCOUNTER — TELEPHONE (OUTPATIENT)
Dept: OBSTETRICS AND GYNECOLOGY | Facility: CLINIC | Age: 52
End: 2022-08-23

## 2022-08-23 VITALS
HEIGHT: 62 IN | HEART RATE: 97 BPM | SYSTOLIC BLOOD PRESSURE: 120 MMHG | BODY MASS INDEX: 27.05 KG/M2 | WEIGHT: 147 LBS | DIASTOLIC BLOOD PRESSURE: 76 MMHG

## 2022-08-23 DIAGNOSIS — D06.9 SEVERE DYSPLASIA OF CERVIX (CIN III): Primary | ICD-10-CM

## 2022-08-23 PROCEDURE — 99213 OFFICE O/P EST LOW 20 MIN: CPT | Performed by: OBSTETRICS & GYNECOLOGY

## 2022-08-23 NOTE — ASSESSMENT & PLAN NOTE
Colposcopy biopsy confirms ARETHA-3 with negative ECC  Counseled patient at length regarding the risks benefits cold knife conization of the cervix  All questions were answered and informed consent was obtained  Also counseled the patient that it is most likely her Mirena IUD will be removed at the time of [facility]

## 2022-09-07 ENCOUNTER — OFFICE VISIT (OUTPATIENT)
Dept: FAMILY MEDICINE CLINIC | Facility: CLINIC | Age: 52
End: 2022-09-07

## 2022-09-07 VITALS
OXYGEN SATURATION: 99 % | DIASTOLIC BLOOD PRESSURE: 74 MMHG | SYSTOLIC BLOOD PRESSURE: 108 MMHG | HEART RATE: 99 BPM | HEIGHT: 62 IN | TEMPERATURE: 98.1 F | BODY MASS INDEX: 27.27 KG/M2 | WEIGHT: 148.2 LBS

## 2022-09-07 DIAGNOSIS — Z11.59 ENCOUNTER FOR HEPATITIS C SCREENING TEST FOR LOW RISK PATIENT: ICD-10-CM

## 2022-09-07 DIAGNOSIS — E11.9 ENCOUNTER FOR DIABETIC FOOT EXAM: ICD-10-CM

## 2022-09-07 DIAGNOSIS — I10 ESSENTIAL HYPERTENSION: Primary | ICD-10-CM

## 2022-09-07 DIAGNOSIS — F33.1 MODERATE EPISODE OF RECURRENT MAJOR DEPRESSIVE DISORDER: ICD-10-CM

## 2022-09-07 DIAGNOSIS — E11.9 TYPE 2 DIABETES MELLITUS WITHOUT COMPLICATION, WITHOUT LONG-TERM CURRENT USE OF INSULIN: ICD-10-CM

## 2022-09-07 DIAGNOSIS — F41.9 ANXIETY: ICD-10-CM

## 2022-09-07 DIAGNOSIS — Z12.31 ENCOUNTER FOR SCREENING MAMMOGRAM FOR MALIGNANT NEOPLASM OF BREAST: ICD-10-CM

## 2022-09-07 DIAGNOSIS — J45.20 MILD INTERMITTENT ASTHMA WITHOUT COMPLICATION: ICD-10-CM

## 2022-09-07 PROCEDURE — 86803 HEPATITIS C AB TEST: CPT

## 2022-09-07 PROCEDURE — 99214 OFFICE O/P EST MOD 30 MIN: CPT

## 2022-09-07 PROCEDURE — 83036 HEMOGLOBIN GLYCOSYLATED A1C: CPT

## 2022-09-07 RX ORDER — SERTRALINE HYDROCHLORIDE 100 MG/1
100 TABLET, FILM COATED ORAL DAILY
Qty: 90 TABLET | Refills: 1 | Status: SHIPPED | OUTPATIENT
Start: 2022-09-07

## 2022-09-07 NOTE — ASSESSMENT & PLAN NOTE
Diabetes is unchanged.   Continue current treatment regimen.  Dietary recommendations for ADA diet.  Discussed foot care.  Reminded to get yearly retinal exam.  Diabetes will be reassessed in 3 months.

## 2022-09-07 NOTE — PROGRESS NOTES
Chief Complaint  Chief Complaint   Patient presents with   • Diabetes       Subjective          Beba Ambrose presents to [facility]  History of Present Illness  Patient presents today to follow up on diabetes, hypertension, asthma, depression and anxiety.  Blood pressure and blood sugars are stable and well maintained on current medications.    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  She states that she has been feeling more stress and anxiety regarding this and also within her family as she has 2 daughters who are getting  and a son who is going to college in [place].  She feels like the Zoloft was working for her initially but that she could benefit from an increased dose.    She uses her albuterol inhaler and nebulizer occasionally, stating that she typically needs them more with season changes.  Therefore she is anticipating an increase in use as we go into the fall winter seasons.    She will be having a loop procedure done by OB/GYN on 9/12/2022.  Objective     Medical History:  Past Medical History:   Diagnosis Date   • Acute cystitis 07/15/2016   • ADHD (attention deficit hyperactivity disorder) 12/01/2020   • Allergies    • Anxiety    • Asthma    • Broken bones    • Depression 12/01/2020   • Diabetes mellitus, type 2 (HCC) 01/08/2021   • Essential hypertension 12/01/2020   • Glucose found in urine on examination 12/01/2020    HISTORY OF GLUCOSE IN HER URINE, WE WILL CHECK AN A1C TODAY AND WE WILL SEND A URINALYSIS FOR MICROSCOPY.   • Migraine headache      Past Surgical History:   Procedure Laterality Date   • BREAST AUGMENTATION     • VAGINAL BIOPSY        Social History     Tobacco Use   • Smoking status: Former Smoker     Packs/day: 0.50     Years: 15.00     Pack years: 7.50      Types: Cigarettes     Start date: 3/18/2005     Quit date: 3/1/2020     Years since quittin.5   • Smokeless tobacco: Never Used   • Tobacco comment: STARTED SMOKING AT AGE 2; SMOKED 10 CIGS A DAY; SOME SECOND HAND SMOKE EXPOSURE   Vaping Use   • Vaping Use: Former   • Start date: 2019   • Quit date: 2020   Substance Use Topics   • Alcohol use: Not Currently   • Drug use: Never     Family History   Problem Relation Age of Onset   • Parkinsonism Father    • Cancer Other        Medications:  Prior to Admission medications    Medication Sig Start Date End Date Taking? Authorizing Provider   albuterol (ACCUNEB) 1.25 MG/3ML nebulizer solution Take 3 mL by nebulization Every 6 (Six) Hours As Needed for Wheezing or Shortness of Air. 22  Yes Margarita Joya APRN   albuterol sulfate  (90 Base) MCG/ACT inhaler INHALE TWO PUFFS BY MOUTH EVERY 4 HOURS AS NEEDED 6/10/22  Yes Edgardo Lopes APRN   budesonide-formoterol (SYMBICORT) 160-4.5 MCG/ACT inhaler inhale TWO puffs by MOUTH TWO TIMES PER DAY IN THE morning AND EVENING for 30 DAYS 6/10/22  Yes Edgardo Lopes APRN   Januvia 50 MG tablet TAKE 1 TABLET BY MOUTH EVERY DAY 6/10/22  Yes Edgardo Lopes APRN   lisinopril (PRINIVIL,ZESTRIL) 10 MG tablet Take 2 tablets by mouth Daily. 21  Yes Margarita Joya APRN   sertraline (ZOLOFT) 100 MG tablet Take 1 tablet by mouth Daily. 22  Yes Margarita Joya APRN   Zoster Vac Recomb Adjuvanted 50 MCG/0.5ML reconstituted suspension Inject 0.5 mL into the appropriate muscle as directed by prescriber Daily. 22  Yes Margarita Joya APRN   amphetamine-dextroamphetamine (ADDERALL) 20 MG tablet   22 Yes ProviderBalaji MD   benzonatate (Tessalon Perles) 100 MG capsule Take 1 capsule by mouth 3 (Three) Times a Day As Needed for Cough. 22 Yes Phuong Garcia APRN   guaiFENesin (Mucinex) 600 MG 12 hr tablet Take 2 tablets by mouth 2 (Two) Times a  "Day. 8/12/22 9/7/22 Yes Phuong Garcia Jannette, AJ        Allergies:   Metformin    Health Maintenance Due   Topic Date Due   • MAMMOGRAM  Never done   • ANNUAL PHYSICAL  Never done   • Pneumococcal Vaccine 0-64 (1 - PCV) Never done   • Hepatitis B (1 of 3 - Risk 3-dose series) Never done   • TDAP/TD VACCINES (1 - Tdap) Never done   • ZOSTER VACCINE (1 of 2) Never done   • HEPATITIS C SCREENING  Never done   • DIABETIC EYE EXAM  Never done   • COVID-19 Vaccine (4 - Booster for Pfizer series) 05/25/2022   • HEMOGLOBIN A1C  07/26/2022           Vital Signs:     /74   Pulse 99   Temp 98.1 °F (36.7 °C)   Ht 157.5 cm (62\")   Wt 67.2 kg (148 lb 3.2 oz)   SpO2 99%   BMI 27.11 kg/m²       Physical Exam  Vitals reviewed.   Constitutional:       Appearance: Normal appearance. She is well-developed.   HENT:      Head: Normocephalic and atraumatic.      Right Ear: External ear normal.      Left Ear: External ear normal.      Mouth/Throat:      Pharynx: No oropharyngeal exudate.   Eyes:      Conjunctiva/sclera: Conjunctivae normal.      Pupils: Pupils are equal, round, and reactive to light.   Cardiovascular:      Rate and Rhythm: Normal rate and regular rhythm.      Pulses:           Dorsalis pedis pulses are 2+ on the right side and 2+ on the left side.      Heart sounds: No murmur heard.    No friction rub. No gallop.   Pulmonary:      Effort: Pulmonary effort is normal.      Breath sounds: Normal breath sounds. No wheezing or rhonchi.   Abdominal:      General: Bowel sounds are normal. There is no distension.      Palpations: Abdomen is soft.      Tenderness: There is no abdominal tenderness.   Feet:      Right foot:      Protective Sensation: 3 sites tested. 3 sites sensed.      Skin integrity: Skin integrity normal. No ulcer or blister.      Toenail Condition: Right toenails are normal.      Left foot:      Protective Sensation: 3 sites tested. 3 sites sensed.      Skin integrity: Skin integrity normal. No " ulcer or blister.      Toenail Condition: Left toenails are normal.      Comments:      Skin:     General: Skin is warm and dry.   Neurological:      Mental Status: She is alert and oriented to person, place, and time.      Cranial Nerves: No cranial nerve deficit.   Psychiatric:         Mood and Affect: Mood and affect normal.         Behavior: Behavior normal.         Thought Content: Thought content normal.         Judgment: Judgment normal.          Result Review :                   Assessment and Plan    Diagnoses and all orders for this visit:    1. Essential hypertension (Primary)  Assessment & Plan:  Hypertension is improving with treatment.  Continue current treatment regimen.  Blood pressure will be reassessed in 3 months.      2. Type 2 diabetes mellitus without complication, without long-term current use of insulin (HCC)  Assessment & Plan:  Diabetes is unchanged.   Continue current treatment regimen.  Dietary recommendations for ADA diet.  Discussed foot care.  Reminded to get yearly retinal exam.  Diabetes will be reassessed in 3 months.    Orders:  -     Hemoglobin A1c    3. Mild intermittent asthma without complication    4. Anxiety  -     sertraline (ZOLOFT) 100 MG tablet; Take 1 tablet by mouth Daily.  Dispense: 90 tablet; Refill: 1  -     sertraline (Zoloft) 50 MG tablet; Take 1 tablet by mouth Daily.  Dispense: 90 tablet; Refill: 1    5. Moderate episode of recurrent major depressive disorder (HCC)  -     sertraline (ZOLOFT) 100 MG tablet; Take 1 tablet by mouth Daily.  Dispense: 90 tablet; Refill: 1  -     sertraline (Zoloft) 50 MG tablet; Take 1 tablet by mouth Daily.  Dispense: 90 tablet; Refill: 1    6. Encounter for screening mammogram for malignant neoplasm of breast  -     Mammo Screening Digital Tomosynthesis Bilateral With CAD; Future    7. Encounter for hepatitis C screening test for low risk patient  -     Hepatitis C antibody    8. Encounter for diabetic foot exam (HCC)    Patient was  advised to have a diabetic eye exam and was referred to Dr. Barber.  Diabetic foot exam done in office today with no abnormalities or neuropathy noted.  She is due a screening mammogram and is aware that she will be notified with an appointment.  Zoloft will be increased to 150 mg daily.  She will follow-up with me in about 3 months or sooner if she has any concerns.      Smoking Cessation:    Beba Ambrose  reports that she quit smoking about 2 years ago. Her smoking use included cigarettes. She started smoking about 17 years ago. She has a 7.50 pack-year smoking history. She has never used smokeless tobacco.          Follow Up   Return in about 3 months (around 12/7/2022).  Patient was given instructions and counseling regarding her condition or for health maintenance advice. Please see specific information pulled into the AVS if appropriate.

## 2022-09-08 LAB
HBA1C MFR BLD: 6.5 % (ref 4.8–5.6)
HCV AB SER DONR QL: NORMAL

## 2022-09-08 PROCEDURE — S0260 H&P FOR SURGERY: HCPCS | Performed by: OBSTETRICS & GYNECOLOGY

## 2022-09-08 PROCEDURE — 58301 REMOVE INTRAUTERINE DEVICE: CPT | Performed by: OBSTETRICS & GYNECOLOGY

## 2022-09-08 NOTE — H&P
[facility]   PREOPERATIVE HISTORY AND PHYSICAL    Patient Name:Beba Ambrose  : 1970  MRN: [id number]  Primary Care Physician: Phuong Garcia APRN  Date of admission: (Not on file)    Subjective   Subjective     Chief Complaint: preoperative evaluation    History of Present Illness  Beba Ambrose is a 52 y.o. female who presents for preoperative evaluation. She is scheduled for LOOP ELECTROCAUTERY EXCISION PROCEDURE (N/A)    Review of Systems     Personal History     Past Medical History:   Diagnosis Date   • Acute cystitis 07/15/2016   • ADHD (attention deficit hyperactivity disorder) 2020   • Allergies    • Anxiety    • Asthma     inhalers as needed   • Broken bones    • Depression 2020   • Diabetes mellitus, type 2 (HCC) 2021   • Essential hypertension 2020   • Glucose found in urine on examination 2020    HISTORY OF GLUCOSE IN HER URINE, WE WILL CHECK AN A1C TODAY AND WE WILL SEND A URINALYSIS FOR MICROSCOPY.   • Migraine headache        Past Surgical History:   Procedure Laterality Date   • BREAST AUGMENTATION     • VAGINAL BIOPSY         Family History: Her family history includes Cancer in an other family member; Parkinsonism in her father.     Social History: She  reports that she quit smoking about 2 years ago. Her smoking use included cigarettes. She started smoking about 17 years ago. She has a 7.50 pack-year smoking history. She has never used smokeless tobacco. She reports previous alcohol use. She reports that she does not use drugs.    Home Medications:  SITagliptin, albuterol, albuterol sulfate HFA, budesonide-formoterol, lisinopril, and sertraline    Allergies:  She is allergic to metformin.    Objective    Objective     Vitals:         Physical Exam  Constitutional:       Appearance: Normal appearance.   Cardiovascular:      Rate and Rhythm: Normal rate and regular rhythm.      Heart sounds: Normal heart sounds.   Pulmonary:      Effort: Pulmonary  effort is normal.      Breath sounds: Normal breath sounds.   Abdominal:      General: Abdomen is flat. Bowel sounds are normal.      Palpations: Abdomen is soft.   Neurological:      Mental Status: She is alert.         Assessment & Plan   Assessment / Plan     Brief Patient Summary:  Beba Ambrose is a 52 y.o. female who presents for preoperative evaluation.    Pre-Op Diagnosis Codes:     * Severe dysplasia of cervix (ARETHA III) [D06.9]    Active Hospital Problems:  Active Hospital Problems    Diagnosis    • **Severe dysplasia of cervix (ARETHA III)      Plan:   Procedure(s):  LOOP ELECTROCAUTERY EXCISION PROCEDURE    The risks, benefits, and alternatives of the procedure including but not limited to Pt was counseled at length regarding the risks and benefits of surgery.  The risks include but are not limited to the risk of anesthesia, the risk of bleeding, the risk of infection, the risk of injury to the bowel, bladder, ureters and any surrounding structures.  Risks also include possibility of needing future surgery to correct an issue as a result of the first surgery.  All questions were answered and informed consent was obtained. and risks of the anesthesia were discussed in detail with the patient and questions were answered. No guarantees were made or implied. Informed consent was obtained.    Esme Lambert MD

## 2022-09-08 NOTE — PRE-PROCEDURE INSTRUCTIONS
Patient instructed to have no food past midnight, clears up to 2 hours prior to arrival time. Patient instructed to wear no lotions, jewelry or piercing's day of surgery.  Patient able to take Zoloft am of surgery

## 2022-09-09 ENCOUNTER — PATIENT ROUNDING (BHMG ONLY) (OUTPATIENT)
Dept: FAMILY MEDICINE CLINIC | Facility: CLINIC | Age: 52
End: 2022-09-09

## 2022-09-09 ENCOUNTER — TELEPHONE (OUTPATIENT)
Dept: FAMILY MEDICINE CLINIC | Facility: CLINIC | Age: 52
End: 2022-09-09

## 2022-09-09 NOTE — PROGRESS NOTES
September 9, 2022    Hello, may I speak with Beba Ambrose?    My name is Bethel Gallegos      I am  with [facility]  [address]  [address]  [place] [address]  [phone].    Before we get started may I verify your date of birth? 1970    I am calling to officially welcome you to our practice and ask about your recent visit. Is this a good time to talk? yes    Tell me about your visit with us. What things went well?  Loved Phuong she was easy to talk too and that has not been my exp in the past       We're always looking for ways to make our patients' experiences even better. Do you have recommendations on ways we may improve?  no    Overall were you satisfied with your first visit to our practice? yes       I appreciate you taking the time to speak with me today. Is there anything else I can do for you? no      Thank you, and have a great day.

## 2022-09-12 ENCOUNTER — ANESTHESIA (OUTPATIENT)
Dept: PERIOP | Facility: HOSPITAL | Age: 52
End: 2022-09-12

## 2022-09-12 ENCOUNTER — HOSPITAL ENCOUNTER (OUTPATIENT)
Facility: HOSPITAL | Age: 52
Setting detail: HOSPITAL OUTPATIENT SURGERY
Discharge: HOME | End: 2022-09-12
Attending: OBSTETRICS & GYNECOLOGY | Admitting: OBSTETRICS & GYNECOLOGY

## 2022-09-12 ENCOUNTER — ANESTHESIA EVENT (OUTPATIENT)
Dept: PERIOP | Facility: HOSPITAL | Age: 52
End: 2022-09-12

## 2022-09-12 VITALS
TEMPERATURE: 97.1 F | HEIGHT: 63 IN | BODY MASS INDEX: 25.55 KG/M2 | HEART RATE: 97 BPM | DIASTOLIC BLOOD PRESSURE: 61 MMHG | RESPIRATION RATE: 16 BRPM | OXYGEN SATURATION: 97 % | WEIGHT: 144.18 LBS | SYSTOLIC BLOOD PRESSURE: 105 MMHG

## 2022-09-12 DIAGNOSIS — D06.9 SEVERE DYSPLASIA OF CERVIX (CIN III): ICD-10-CM

## 2022-09-12 LAB
B-HCG UR QL: NEGATIVE
BASOPHILS # BLD AUTO: 0.05 10*3/MM3 (ref 0–0.2)
BASOPHILS NFR BLD AUTO: 0.5 % (ref 0–1.5)
DEPRECATED RDW RBC AUTO: 45.4 FL (ref 37–54)
EOSINOPHIL # BLD AUTO: 0.28 10*3/MM3 (ref 0–0.4)
EOSINOPHIL NFR BLD AUTO: 2.8 % (ref 0.3–6.2)
ERYTHROCYTE [DISTWIDTH] IN BLOOD BY AUTOMATED COUNT: 13.3 % (ref 12.3–15.4)
GLUCOSE BLDC GLUCOMTR-MCNC: 117 MG/DL (ref 70–99)
GLUCOSE BLDC GLUCOMTR-MCNC: 121 MG/DL (ref 70–99)
HCT VFR BLD AUTO: 43.3 % (ref 34–46.6)
HGB BLD-MCNC: 14.2 G/DL (ref 12–15.9)
IMM GRANULOCYTES # BLD AUTO: 0.04 10*3/MM3 (ref 0–0.05)
IMM GRANULOCYTES NFR BLD AUTO: 0.4 % (ref 0–0.5)
LYMPHOCYTES # BLD AUTO: 1.67 10*3/MM3 (ref 0.7–3.1)
LYMPHOCYTES NFR BLD AUTO: 16.7 % (ref 19.6–45.3)
MCH RBC QN AUTO: 30.4 PG (ref 26.6–33)
MCHC RBC AUTO-ENTMCNC: 32.8 G/DL (ref 31.5–35.7)
MCV RBC AUTO: 92.7 FL (ref 79–97)
MONOCYTES # BLD AUTO: 0.8 10*3/MM3 (ref 0.1–0.9)
MONOCYTES NFR BLD AUTO: 8 % (ref 5–12)
NEUTROPHILS NFR BLD AUTO: 7.19 10*3/MM3 (ref 1.7–7)
NEUTROPHILS NFR BLD AUTO: 71.6 % (ref 42.7–76)
NRBC BLD AUTO-RTO: 0 /100 WBC (ref 0–0.2)
PLATELET # BLD AUTO: 363 10*3/MM3 (ref 140–450)
PMV BLD AUTO: 8.6 FL (ref 6–12)
RBC # BLD AUTO: 4.67 10*6/MM3 (ref 3.77–5.28)
WBC NRBC COR # BLD: 10.03 10*3/MM3 (ref 3.4–10.8)

## 2022-09-12 PROCEDURE — 25010000002 KETOROLAC TROMETHAMINE PER 15 MG: Performed by: NURSE ANESTHETIST, CERTIFIED REGISTERED

## 2022-09-12 PROCEDURE — 25010000002 FENTANYL CITRATE (PF) 50 MCG/ML SOLUTION: Performed by: NURSE ANESTHETIST, CERTIFIED REGISTERED

## 2022-09-12 PROCEDURE — 85025 COMPLETE CBC W/AUTO DIFF WBC: CPT | Performed by: OBSTETRICS & GYNECOLOGY

## 2022-09-12 PROCEDURE — 25010000002 ONDANSETRON PER 1 MG: Performed by: NURSE ANESTHETIST, CERTIFIED REGISTERED

## 2022-09-12 PROCEDURE — 25010000002 MIDAZOLAM PER 1 MG: Performed by: ANESTHESIOLOGY

## 2022-09-12 PROCEDURE — 81025 URINE PREGNANCY TEST: CPT | Performed by: ANESTHESIOLOGY

## 2022-09-12 PROCEDURE — 88307 TISSUE EXAM BY PATHOLOGIST: CPT | Performed by: OBSTETRICS & GYNECOLOGY

## 2022-09-12 PROCEDURE — 25010000002 CEFAZOLIN IN DEXTROSE 2-4 GM/100ML-% SOLUTION: Performed by: OBSTETRICS & GYNECOLOGY

## 2022-09-12 PROCEDURE — 82962 GLUCOSE BLOOD TEST: CPT

## 2022-09-12 PROCEDURE — 25010000002 PROPOFOL 10 MG/ML EMULSION: Performed by: NURSE ANESTHETIST, CERTIFIED REGISTERED

## 2022-09-12 PROCEDURE — 57522 CONIZATION OF CERVIX: CPT | Performed by: OBSTETRICS & GYNECOLOGY

## 2022-09-12 PROCEDURE — 88305 TISSUE EXAM BY PATHOLOGIST: CPT | Performed by: OBSTETRICS & GYNECOLOGY

## 2022-09-12 RX ORDER — CEFAZOLIN SODIUM 2 G/100ML
2 INJECTION, SOLUTION INTRAVENOUS ONCE
Status: COMPLETED | OUTPATIENT
Start: 2022-09-12 | End: 2022-09-12

## 2022-09-12 RX ORDER — SODIUM CHLORIDE 0.9 % (FLUSH) 0.9 %
1-10 SYRINGE (ML) INJECTION AS NEEDED
Status: DISCONTINUED | OUTPATIENT
Start: 2022-09-12 | End: 2022-09-12 | Stop reason: HOSPADM

## 2022-09-12 RX ORDER — ACETAMINOPHEN 500 MG
1000 TABLET ORAL ONCE
Status: DISCONTINUED | OUTPATIENT
Start: 2022-09-12 | End: 2022-09-12 | Stop reason: SDUPTHER

## 2022-09-12 RX ORDER — IBUPROFEN 600 MG/1
600 TABLET ORAL EVERY 6 HOURS PRN
Qty: 40 TABLET | Refills: 0 | Status: SHIPPED | OUTPATIENT
Start: 2022-09-12

## 2022-09-12 RX ORDER — MIDAZOLAM HYDROCHLORIDE 1 MG/ML
2 INJECTION INTRAMUSCULAR; INTRAVENOUS ONCE
Status: COMPLETED | OUTPATIENT
Start: 2022-09-12 | End: 2022-09-12

## 2022-09-12 RX ORDER — ONDANSETRON 2 MG/ML
4 INJECTION INTRAMUSCULAR; INTRAVENOUS ONCE AS NEEDED
Status: DISCONTINUED | OUTPATIENT
Start: 2022-09-12 | End: 2022-09-12 | Stop reason: HOSPADM

## 2022-09-12 RX ORDER — PROMETHAZINE HYDROCHLORIDE 25 MG/1
25 SUPPOSITORY RECTAL ONCE AS NEEDED
Status: DISCONTINUED | OUTPATIENT
Start: 2022-09-12 | End: 2022-09-12 | Stop reason: HOSPADM

## 2022-09-12 RX ORDER — LUGOLS 0.4 G/G
LIQUID TOPICAL AS NEEDED
Status: DISCONTINUED | OUTPATIENT
Start: 2022-09-12 | End: 2022-09-12 | Stop reason: HOSPADM

## 2022-09-12 RX ORDER — PROMETHAZINE HYDROCHLORIDE 12.5 MG/1
25 TABLET ORAL ONCE AS NEEDED
Status: DISCONTINUED | OUTPATIENT
Start: 2022-09-12 | End: 2022-09-12 | Stop reason: HOSPADM

## 2022-09-12 RX ORDER — SODIUM CHLORIDE 0.9 % (FLUSH) 0.9 %
3 SYRINGE (ML) INJECTION EVERY 12 HOURS SCHEDULED
Status: DISCONTINUED | OUTPATIENT
Start: 2022-09-12 | End: 2022-09-12 | Stop reason: HOSPADM

## 2022-09-12 RX ORDER — ONDANSETRON 2 MG/ML
INJECTION INTRAMUSCULAR; INTRAVENOUS AS NEEDED
Status: DISCONTINUED | OUTPATIENT
Start: 2022-09-12 | End: 2022-09-12 | Stop reason: SURG

## 2022-09-12 RX ORDER — MEPERIDINE HYDROCHLORIDE 25 MG/ML
12.5 INJECTION INTRAMUSCULAR; INTRAVENOUS; SUBCUTANEOUS
Status: DISCONTINUED | OUTPATIENT
Start: 2022-09-12 | End: 2022-09-12 | Stop reason: HOSPADM

## 2022-09-12 RX ORDER — OXYCODONE HYDROCHLORIDE 5 MG/1
5 TABLET ORAL
Status: DISCONTINUED | OUTPATIENT
Start: 2022-09-12 | End: 2022-09-12 | Stop reason: HOSPADM

## 2022-09-12 RX ORDER — KETOROLAC TROMETHAMINE 30 MG/ML
INJECTION, SOLUTION INTRAMUSCULAR; INTRAVENOUS AS NEEDED
Status: DISCONTINUED | OUTPATIENT
Start: 2022-09-12 | End: 2022-09-12 | Stop reason: SURG

## 2022-09-12 RX ORDER — ACETAMINOPHEN 500 MG
1000 TABLET ORAL ONCE
Status: DISCONTINUED | OUTPATIENT
Start: 2022-09-12 | End: 2022-09-12 | Stop reason: HOSPADM

## 2022-09-12 RX ORDER — BUPIVACAINE HYDROCHLORIDE AND EPINEPHRINE 5; 5 MG/ML; UG/ML
INJECTION, SOLUTION EPIDURAL; INTRACAUDAL; PERINEURAL AS NEEDED
Status: DISCONTINUED | OUTPATIENT
Start: 2022-09-12 | End: 2022-09-12 | Stop reason: HOSPADM

## 2022-09-12 RX ORDER — LIDOCAINE HYDROCHLORIDE 20 MG/ML
INJECTION, SOLUTION EPIDURAL; INFILTRATION; INTRACAUDAL; PERINEURAL AS NEEDED
Status: DISCONTINUED | OUTPATIENT
Start: 2022-09-12 | End: 2022-09-12 | Stop reason: SURG

## 2022-09-12 RX ORDER — SODIUM CHLORIDE, SODIUM LACTATE, POTASSIUM CHLORIDE, CALCIUM CHLORIDE 600; 310; 30; 20 MG/100ML; MG/100ML; MG/100ML; MG/100ML
125 INJECTION, SOLUTION INTRAVENOUS CONTINUOUS
Status: DISCONTINUED | OUTPATIENT
Start: 2022-09-12 | End: 2022-09-12 | Stop reason: HOSPADM

## 2022-09-12 RX ORDER — FENTANYL CITRATE 50 UG/ML
INJECTION, SOLUTION INTRAMUSCULAR; INTRAVENOUS AS NEEDED
Status: DISCONTINUED | OUTPATIENT
Start: 2022-09-12 | End: 2022-09-12 | Stop reason: SURG

## 2022-09-12 RX ORDER — SODIUM CHLORIDE, SODIUM LACTATE, POTASSIUM CHLORIDE, CALCIUM CHLORIDE 600; 310; 30; 20 MG/100ML; MG/100ML; MG/100ML; MG/100ML
9 INJECTION, SOLUTION INTRAVENOUS CONTINUOUS PRN
Status: DISCONTINUED | OUTPATIENT
Start: 2022-09-12 | End: 2022-09-12 | Stop reason: HOSPADM

## 2022-09-12 RX ORDER — MAGNESIUM HYDROXIDE 1200 MG/15ML
LIQUID ORAL AS NEEDED
Status: DISCONTINUED | OUTPATIENT
Start: 2022-09-12 | End: 2022-09-12 | Stop reason: HOSPADM

## 2022-09-12 RX ORDER — SODIUM CHLORIDE 9 MG/ML
40 INJECTION, SOLUTION INTRAVENOUS AS NEEDED
Status: DISCONTINUED | OUTPATIENT
Start: 2022-09-12 | End: 2022-09-12 | Stop reason: HOSPADM

## 2022-09-12 RX ADMIN — SODIUM CHLORIDE, POTASSIUM CHLORIDE, SODIUM LACTATE AND CALCIUM CHLORIDE: 600; 310; 30; 20 INJECTION, SOLUTION INTRAVENOUS at 11:44

## 2022-09-12 RX ADMIN — ONDANSETRON 4 MG: 2 INJECTION INTRAMUSCULAR; INTRAVENOUS at 11:55

## 2022-09-12 RX ADMIN — CEFAZOLIN SODIUM 2 G: 2 INJECTION, SOLUTION INTRAVENOUS at 11:44

## 2022-09-12 RX ADMIN — FENTANYL CITRATE 25 MCG: 50 INJECTION, SOLUTION INTRAMUSCULAR; INTRAVENOUS at 12:04

## 2022-09-12 RX ADMIN — LIDOCAINE HYDROCHLORIDE 60 MG: 20 INJECTION, SOLUTION EPIDURAL; INFILTRATION; INTRACAUDAL; PERINEURAL at 11:48

## 2022-09-12 RX ADMIN — FENTANYL CITRATE 25 MCG: 50 INJECTION, SOLUTION INTRAMUSCULAR; INTRAVENOUS at 11:58

## 2022-09-12 RX ADMIN — FENTANYL CITRATE 50 MCG: 50 INJECTION, SOLUTION INTRAMUSCULAR; INTRAVENOUS at 11:48

## 2022-09-12 RX ADMIN — PROPOFOL 250 MCG/KG/MIN: 10 INJECTION, EMULSION INTRAVENOUS at 11:48

## 2022-09-12 RX ADMIN — KETOROLAC TROMETHAMINE 30 MG: 30 INJECTION, SOLUTION INTRAMUSCULAR; INTRAVENOUS at 12:08

## 2022-09-12 RX ADMIN — MIDAZOLAM HYDROCHLORIDE 2 MG: 1 INJECTION, SOLUTION INTRAMUSCULAR; INTRAVENOUS at 11:41

## 2022-09-12 NOTE — DISCHARGE INSTRUCTIONS
DISCHARGE INSTRUCTIONS  GYNECOLOGICAL  PROCEDURES      For your surgery you had:    Local anesthesia  Monitored anesthesia care      You may experience dizziness, drowsiness, or lightheadedness for several hours following surgery.  Do not stay alone today or tonight.  Limit your activity for 24 hours.  Resume your diet slowly.  Follow any special dietary instructions you may have been given by your doctor.  You should not drive or operate machinery, drink alcohol, or sign legally binding documents for 24 hours or while you are taking pain medication.  Last dose of pain medication was given at:  .  NOTIFY YOUR DOCTOR IF YOU EXPERIENCE ANY OF THE FOLLOWING:  Temperature greater than 101 degrees Fahrenheit  Shaking Chills  Redness or excessive drainage from incision  Nausea, vomiting and/or pain that is not controlled by prescribed medications  Increase in bleeding or bleeding that is excessive  Unable to urinate in 6 hours after surgery  If unable to reach your doctor, please go to the closest Emergency Room    [x] You may resume intercourse and the use of tampons as your physician has instructed you.  [x] Nothing in the vagina for 2 weeks to include intercourse, douches, or tampons.  [x] Vaginal bleeding may be expected for several days with flow decreasing with time and never any heavier than a normal   period.    If you have foul smelling discharge, notify your physician.  Medications per physician instructions as indicated on Discharge Medication Information Sheet.  You should see   for follow-up care   on   .  Phone number:      SPECIAL INSTRUCTIONS:

## 2022-09-12 NOTE — ANESTHESIA PREPROCEDURE EVALUATION
Anesthesia Evaluation     Patient summary reviewed and Nursing notes reviewed   no history of anesthetic complications:  NPO Solid Status: > 8 hours  NPO Liquid Status: > 2 hours           Airway   Mallampati: II  TM distance: >3 FB  Neck ROM: full  No difficulty expected  Dental      Pulmonary - normal exam    breath sounds clear to auscultation  (+) asthma,  Cardiovascular - normal exam  Exercise tolerance: good (4-7 METS)    Rhythm: regular  Rate: normal    (+) hypertension well controlled,       Neuro/Psych  (+) headaches, psychiatric history Anxiety,    GI/Hepatic/Renal/Endo    (+)   diabetes mellitus type 2,     Musculoskeletal (-) negative ROS    Abdominal    Substance History - negative use     OB/GYN negative ob/gyn ROS         Other - negative ROS       ROS/Med Hx Other: PAT Nursing Notes unavailable.                   Anesthesia Plan    ASA 3     general     (Patient understands anesthesia not responsible for dental damage.)  intravenous induction     Anesthetic plan, risks, benefits, and alternatives have been provided, discussed and informed consent has been obtained with: patient.    Plan discussed with CRNA.        CODE STATUS:

## 2022-09-12 NOTE — ANESTHESIA POSTPROCEDURE EVALUATION
Patient: Beba Ambrose    Procedure Summary     Date: 09/12/22 Room / Location: [facility]  / [facility]    Anesthesia Start: 1144 Anesthesia Stop: 1215    Procedure: LOOP ELECTROCAUTERY EXCISION PROCEDURE (N/A Cervix) Diagnosis:       Severe dysplasia of cervix (ARETHA III)      (Severe dysplasia of cervix (ARETHA III) [D06.9])    Surgeons: Esme Lambert MD Provider: Aldo Julian MD    Anesthesia Type: general ASA Status: 3          Anesthesia Type: general    Vitals  Vitals Value Taken Time   /61 09/12/22 1248   Temp 36.8 °C (98.2 °F) 09/12/22 1213   Pulse 95 09/12/22 1249   Resp 10 09/12/22 1213   SpO2 97 % 09/12/22 1249   Vitals shown include unvalidated device data.        Post Anesthesia Care and Evaluation    Patient location during evaluation: bedside  Patient participation: complete - patient participated  Level of consciousness: awake and alert  Pain management: adequate    Airway patency: patent  Anesthetic complications: No anesthetic complications  PONV Status: none  Cardiovascular status: acceptable  Respiratory status: acceptable  Hydration status: acceptable    Comments: An Anesthesiologist personally participated in the most demanding procedures (including induction and emergence if applicable) in the anesthesia plan, monitored the course of anesthesia administration at frequent intervals and remained physically present and available for immediate diagnosis and treatment of emergencies.

## 2022-09-12 NOTE — OP NOTE
GYN Operative Note      Date of Service:  09/12/22     Pre-Operative Dx:   Severe dysplasia of cervix (ARETHA III) [D06.9]     Postoperative dx:   SEA    Procedure(s):  LEEP  Removal of Mirena IUD    Surgeon/Assistant:  Surgeon(s):  Esme Lambert MD    Anesthesia:  Choice    EBL:  0 Mls    Findings:   Absence of Lugols stain from 10-2 o'clock on the anterior lip of the cervix    Specimens:  ID Type Source Tests Collected by Time   A (Not marked as sent) : Cervix, [place] at 12 o'clock, Pink at 6 o'clock Tissue Cervix TISSUE PATHOLOGY EXAM Esme Lambert MD 9/12/2022 1122   B (Not marked as sent) : Endocervical curettings Tissue Endocervix TISSUE PATHOLOGY EXAM Esme Lambert MD 9/12/2022 1203       Procedure Details:  Pt was taken to the operating room and placed under MAC.  She was placed in dorsal lithotomy and prepped and draped in the usual sterile fashion.  I was called to the room following completion of draping.  A surgical time out was performed.  An insulated speculum was placed into the vagina.  The Lugols solution was applied to the cervix with the above noted findings.  The IUD strings were unable to be manipulated without removal of the IUD.  The Mirena IUD was removed intact and discarded.  The cervix was injected circumferentially with 0.5% Marcaine with epinephrine approximately 10 ml.  A single pass was made with a 12 x 15 mm loop.  An ECC was obtained.  Rollerball cautery was used to ensure hemostasis.  Excellent hemostasis was observed. All instruments were removed from the vagina.  All lap, sponge, and needle counts were correct x 3.  The pt tolerated the procedure well and went to the recovery room in stable condition    Complications:  None    Condition:  Good    Disposition:  PACU          Electronically signed by:  Esme Lambert MD, 09/12/22, 12:08 PM EDT.

## 2022-09-27 ENCOUNTER — OFFICE VISIT (OUTPATIENT)
Dept: OBSTETRICS AND GYNECOLOGY | Facility: CLINIC | Age: 52
End: 2022-09-27

## 2022-09-27 VITALS
WEIGHT: 147 LBS | DIASTOLIC BLOOD PRESSURE: 75 MMHG | HEART RATE: 103 BPM | SYSTOLIC BLOOD PRESSURE: 120 MMHG | BODY MASS INDEX: 26.04 KG/M2

## 2022-09-27 DIAGNOSIS — D06.9 SEVERE DYSPLASIA OF CERVIX (CIN III): Primary | ICD-10-CM

## 2022-09-27 PROCEDURE — 99024 POSTOP FOLLOW-UP VISIT: CPT | Performed by: OBSTETRICS & GYNECOLOGY

## 2022-09-27 NOTE — ASSESSMENT & PLAN NOTE
Pt is 2 weeks s/p LEEP and removal of IUD  Pathology had ARETHA III with a positive ectocervical margin from 9-12 and all other margins negative  ARETHA III endocervical margin was close from 12-6 o'clock but negative  ECC negative  Per ASCCP guidelines will schedule colposcopy and ECC in 6 months

## 2022-09-27 NOTE — PROGRESS NOTES
Post Operative Visit        Chief Complaint   Patient presents with   • Post-op     HPI:   Pain:  no  Vaginal bleeding:  no  Vaginal discharge:  no  Fever/chills:  no  Good appetite:  yes  Normal bladder function:  yes  Normal bowel function:  yes  Hot flashes: no

## 2022-09-27 NOTE — PROGRESS NOTES
Post Op Office Visit  CC:    Chief Complaint   Patient presents with   • Post-op          HPI: No complaints  Operative report, surgical findings and any pathology reviewed.    PHYSICAL EXAM:  /75   Pulse 103   Wt 66.7 kg (147 lb)   LMP  (LMP Unknown)   BMI 26.04 kg/m²   General- NAD, alert and oriented, appropriate  Psych- Normal mood, good memory        ASSESSMENT and PLAN:  Post-operative exam    Diagnoses and all orders for this visit:    1. Severe dysplasia of cervix (ARETHA III) (Primary)  Assessment & Plan:  Pt is 2 weeks s/p LEEP and removal of IUD  Pathology had ARETHA III with a positive ectocervical margin from 9-12 and all other margins negative  ARETHA III endocervical margin was close from 12-6 o'clock but negative  ECC negative  Per ASCCP guidelines will schedule colposcopy and ECC in 6 months            Any available photos and/or pathology were reviewed.  All questions answered.     Ok to resume normal activities  Ok to resume intercourse  Return to school/work without limitations    Counseling: HPV, dysplasia, ARETHA-3, positive margin status        Follow Up:  Return in about 6 months (around 3/27/2023) for colposcopy with ECC.            Esme Lambert MD  09/27/2022

## 2022-10-28 DIAGNOSIS — J45.31 MILD PERSISTENT ASTHMA WITH (ACUTE) EXACERBATION: ICD-10-CM

## 2022-10-31 ENCOUNTER — TELEPHONE (OUTPATIENT)
Dept: FAMILY MEDICINE CLINIC | Facility: CLINIC | Age: 52
End: 2022-10-31

## 2022-10-31 RX ORDER — ALBUTEROL SULFATE 90 UG/1
AEROSOL, METERED RESPIRATORY (INHALATION)
Qty: 18 G | Refills: 2 | Status: SHIPPED | OUTPATIENT
Start: 2022-10-31 | End: 2023-02-16 | Stop reason: SDUPTHER

## 2022-10-31 RX ORDER — SITAGLIPTIN 50 MG/1
TABLET, FILM COATED ORAL
Qty: 90 TABLET | Refills: 2 | Status: SHIPPED | OUTPATIENT
Start: 2022-10-31

## 2022-10-31 RX ORDER — BUDESONIDE AND FORMOTEROL FUMARATE DIHYDRATE 160; 4.5 UG/1; UG/1
AEROSOL RESPIRATORY (INHALATION)
Qty: 10.2 G | Refills: 2 | Status: SHIPPED | OUTPATIENT
Start: 2022-10-31 | End: 2023-01-20

## 2022-10-31 NOTE — TELEPHONE ENCOUNTER
Caller: Beba Ambrose    Relationship: Self    Best call back number: [phone]    What medication are you requesting: PREDNISONE    What are your current symptoms: ASTHMA    Have you had these symptoms before:    [x] Yes  [] No    Have you been treated for these symptoms before:   [x] Yes  [] No    If a prescription is needed, what is your preferred pharmacy and phone number: [facility] - [place], [place] - [address] [facility] - [phone] [facility] [phone] FX

## 2022-10-31 NOTE — TELEPHONE ENCOUNTER
SPOKE WITH PT ABOUT HER MESSAGE. I EXPLAINED TO HER ATUL NEEDED TO SEE HER BEFORE SHE WOULD GIVE HER ANY MEDICATION. ATUL'S NEXT APPOINTMENT WAS MON 11/7/22. PT DIDN'T WANT TO WAIT THAT LONG. I LET THE PT KNOW THAT SHE COULD GO TO ACUTE CARE IF SHE NEEDED TO BE SEE SOONER.

## 2022-11-22 ENCOUNTER — APPOINTMENT (OUTPATIENT)
Dept: MAMMOGRAPHY | Facility: HOSPITAL | Age: 52
End: 2022-11-22

## 2022-12-09 ENCOUNTER — TELEPHONE (OUTPATIENT)
Dept: FAMILY MEDICINE CLINIC | Facility: CLINIC | Age: 52
End: 2022-12-09

## 2022-12-09 NOTE — TELEPHONE ENCOUNTER
Patient missed appointment on 12/07/2022 @ 1015 with Phuong Garcia. Called first number in chart. [phone]. No answer. Left message explaining policy concerning missed appointments and same day cancellations

## 2022-12-09 NOTE — TELEPHONE ENCOUNTER
Patient missed appointment on 12/07/2022 @ 1015 with Phuong Garcia. Called second number in chart. No answer. Second request - sending letter.

## 2023-01-18 RX ORDER — LISINOPRIL 10 MG/1
TABLET ORAL
Qty: 30 TABLET | Refills: 5 | Status: SHIPPED | OUTPATIENT
Start: 2023-01-18

## 2023-01-19 ENCOUNTER — TELEPHONE (OUTPATIENT)
Dept: FAMILY MEDICINE CLINIC | Facility: CLINIC | Age: 53
End: 2023-01-19

## 2023-01-19 NOTE — TELEPHONE ENCOUNTER
Caller: [facility] - [place], [place] - [address] [facility] - [phone]  - [phone] FX    Relationship: Pharmacy    Best call back number: [phone]    What is the best time to reach you: ANYTIME     Who are you requesting to speak with (clinical staff, provider,  specific staff member): NURSE       What was the call regarding: PHARMACY IS CALLING WANTING TO ASK THAT THE MEDICATION BUDESONIDE-FORMOTEROL (SYMBICORT) BE CHANGED TO ADVAIR     Do you require a callback: YES         ”

## 2023-01-20 DIAGNOSIS — J45.20 MILD INTERMITTENT ASTHMA, UNSPECIFIED WHETHER COMPLICATED: Primary | ICD-10-CM

## 2023-01-20 RX ORDER — FLUTICASONE PROPIONATE AND SALMETEROL XINAFOATE 45; 21 UG/1; UG/1
2 AEROSOL, METERED RESPIRATORY (INHALATION)
Qty: 12 G | Refills: 6 | Status: SHIPPED | OUTPATIENT
Start: 2023-01-20

## 2023-02-16 ENCOUNTER — OFFICE VISIT (OUTPATIENT)
Dept: FAMILY MEDICINE CLINIC | Facility: CLINIC | Age: 53
End: 2023-02-16
Payer: COMMERCIAL

## 2023-02-16 VITALS
BODY MASS INDEX: 27.11 KG/M2 | WEIGHT: 153 LBS | DIASTOLIC BLOOD PRESSURE: 74 MMHG | SYSTOLIC BLOOD PRESSURE: 122 MMHG | HEIGHT: 63 IN | OXYGEN SATURATION: 97 % | TEMPERATURE: 98.4 F | HEART RATE: 99 BPM

## 2023-02-16 DIAGNOSIS — J45.41 MODERATE PERSISTENT ASTHMA WITH EXACERBATION: Primary | ICD-10-CM

## 2023-02-16 PROCEDURE — 99214 OFFICE O/P EST MOD 30 MIN: CPT | Performed by: NURSE PRACTITIONER

## 2023-02-16 RX ORDER — ALBUTEROL SULFATE 90 UG/1
2 AEROSOL, METERED RESPIRATORY (INHALATION) EVERY 4 HOURS PRN
Qty: 18 G | Refills: 2 | Status: SHIPPED | OUTPATIENT
Start: 2023-02-16

## 2023-02-16 RX ORDER — MONTELUKAST SODIUM 10 MG/1
10 TABLET ORAL NIGHTLY
Qty: 90 TABLET | Refills: 1 | Status: SHIPPED | OUTPATIENT
Start: 2023-02-16

## 2023-02-16 RX ORDER — METHYLPREDNISOLONE 4 MG/1
TABLET ORAL
Qty: 1 EACH | Refills: 0 | Status: SHIPPED | OUTPATIENT
Start: 2023-02-16 | End: 2023-03-01

## 2023-02-16 NOTE — PROGRESS NOTES
"Chief Complaint  Asthma (x3D, Pt reports SOB and productive \"clear\" cough )    Subjective        Medical History: has a past medical history of Acute cystitis (07/15/2016), ADHD (attention deficit hyperactivity disorder) (12/01/2020), Allergies, Anxiety, Asthma, Broken bones, Depression (12/01/2020), Diabetes mellitus, type 2 (HCC) (01/08/2021), Essential hypertension (12/01/2020), Glucose found in urine on examination (12/01/2020), and Migraine headache.     Surgical History: has a past surgical history that includes Breast Augmentation; Vaginal Biopsy; and LEEP (N/A, 9/12/2022).     Family History: family history includes Cancer in an other family member; Parkinsonism in her father.     Social History: reports that she quit smoking about 2 years ago. Her smoking use included cigarettes. She started smoking about 17 years ago. She has a 7.50 pack-year smoking history. She has never used smokeless tobacco. She reports that she does not currently use alcohol. She reports that she does not use drugs.    Beba Ambrose presents to [facility]  Asthma  She complains of shortness of breath and wheezing. There is no chest tightness or frequent throat clearing. This is a new problem. The current episode started in the past 7 days. The problem occurs constantly. The problem has been waxing and waning. Associated symptoms include dyspnea on exertion, nasal congestion and orthopnea. Pertinent negatives include no ear congestion, rhinorrhea or sneezing. Her symptoms are aggravated by minimal activity. Her symptoms are alleviated by steroid inhaler and ipratropium. She reports no improvement on treatment. Her symptoms are not alleviated by steroid inhaler. Her past medical history is significant for asthma.       Objective   Vital Signs:   /74 (BP Location: Right arm, Patient Position: Sitting, Cuff Size: Adult)   Pulse 99   Temp 98.4 °F (36.9 °C) (Temporal)   Ht 160 cm (63\")   Wt 69.4 kg " (153 lb)   SpO2 97% Comment: Room Air  BMI 27.10 kg/m²       Wt Readings from Last 3 Encounters:   02/16/23 69.4 kg (153 lb)   09/27/22 66.7 kg (147 lb)   09/12/22 65.4 kg (144 lb 2.9 oz)        BP Readings from Last 3 Encounters:   02/16/23 122/74   09/27/22 120/75   09/12/22 105/61               Physical Exam  Vitals reviewed.   Constitutional:       General: She is not in acute distress.     Appearance: Normal appearance. She is well-developed. She is not ill-appearing.   HENT:      Head: Normocephalic and atraumatic.   Eyes:      Conjunctiva/sclera: Conjunctivae normal.      Pupils: Pupils are equal, round, and reactive to light.   Cardiovascular:      Rate and Rhythm: Normal rate and regular rhythm.      Heart sounds: No murmur heard.  Pulmonary:      Effort: Pulmonary effort is normal.      Breath sounds: Wheezing present. No rhonchi.   Skin:     General: Skin is warm and dry.   Neurological:      Mental Status: She is alert and oriented to person, place, and time.   Psychiatric:         Mood and Affect: Mood and affect normal.         Behavior: Behavior normal.         Thought Content: Thought content normal.         Judgment: Judgment normal.        Result Review :  {The following data was reviewed by AJ Cunningham on 02/16/2023.  Common labs    Common Labs 9/7/22 9/12/22   WBC  10.03   Hemoglobin  14.2   Hematocrit  43.3   Platelets  363   Hemoglobin A1C 6.50 (A)    (A) Abnormal value                        Current Outpatient Medications on File Prior to Visit   Medication Sig Dispense Refill   • albuterol (ACCUNEB) 1.25 MG/3ML nebulizer solution Take 3 mL by nebulization Every 6 (Six) Hours As Needed for Wheezing or Shortness of Air. 60 each 12   • fluticasone-salmeterol (Advair HFA) 45-21 MCG/ACT inhaler Inhale 2 puffs 2 (Two) Times a Day. 12 g 6   • ibuprofen (ADVIL,MOTRIN) 600 MG tablet Take 1 tablet by mouth Every 6 (Six) Hours As Needed for Mild Pain. 40 tablet 0   • Januvia 50 MG  tablet TAKE 1 TABLET BY MOUTH EVERY DAY 90 tablet 2   • lisinopril (PRINIVIL,ZESTRIL) 10 MG tablet TAKE TWO TABLETS BY MOUTH EVERY DAY 30 tablet 5   • sertraline (ZOLOFT) 100 MG tablet Take 1 tablet by mouth Daily. 90 tablet 1   • sertraline (Zoloft) 50 MG tablet Take 1 tablet by mouth Daily. 90 tablet 1   • [DISCONTINUED] albuterol sulfate  (90 Base) MCG/ACT inhaler INHALE TWO PUFFS BY MOUTH EVERY FOUR HOURS AS NEEDED 18 g 2     No current facility-administered medications on file prior to visit.        Assessment and Plan  Diagnoses and all orders for this visit:    1. Moderate persistent asthma with exacerbation (Primary)  Comments:  We will give new nebulizer at patient's does not work that well, refill inhaler, steroid pack and Singulair with referral over to allergy and asthma.  Patient i  Orders:  -     Ambulatory Referral to Allergy    Other orders  -     methylPREDNISolone (MEDROL) 4 MG dose pack; Take as directed on package instructions.  Dispense: 1 each; Refill: 0  -     montelukast (Singulair) 10 MG tablet; Take 1 tablet by mouth Every Night.  Dispense: 90 tablet; Refill: 1  -     albuterol sulfate  (90 Base) MCG/ACT inhaler; Inhale 2 puffs Every 4 (Four) Hours As Needed for Wheezing.  Dispense: 18 g; Refill: 2        Follow Up   Return for If symptoms do not improve new concerning symptoms.  Patient was given instructions and counseling regarding her condition or for health maintenance advice. Please see specific information pulled into the AVS if appropriate.       Part of this note may be electronic transcription/translation of spoken language to printed text using the Dragon dictation system

## 2023-03-01 ENCOUNTER — OFFICE VISIT (OUTPATIENT)
Dept: FAMILY MEDICINE CLINIC | Facility: CLINIC | Age: 53
End: 2023-03-01
Payer: COMMERCIAL

## 2023-03-01 VITALS
HEIGHT: 63 IN | HEART RATE: 95 BPM | TEMPERATURE: 97.7 F | SYSTOLIC BLOOD PRESSURE: 124 MMHG | BODY MASS INDEX: 27.38 KG/M2 | OXYGEN SATURATION: 98 % | WEIGHT: 154.5 LBS | DIASTOLIC BLOOD PRESSURE: 82 MMHG

## 2023-03-01 DIAGNOSIS — I10 ESSENTIAL HYPERTENSION: ICD-10-CM

## 2023-03-01 DIAGNOSIS — Z00.00 ANNUAL PHYSICAL EXAM: Primary | ICD-10-CM

## 2023-03-01 DIAGNOSIS — E11.9 TYPE 2 DIABETES MELLITUS WITHOUT COMPLICATION, WITHOUT LONG-TERM CURRENT USE OF INSULIN: ICD-10-CM

## 2023-03-01 DIAGNOSIS — J45.40 MODERATE PERSISTENT ASTHMA WITHOUT COMPLICATION: ICD-10-CM

## 2023-03-01 DIAGNOSIS — F33.1 MODERATE EPISODE OF RECURRENT MAJOR DEPRESSIVE DISORDER: ICD-10-CM

## 2023-03-01 DIAGNOSIS — F41.9 ANXIETY: ICD-10-CM

## 2023-03-01 LAB
ALBUMIN SERPL-MCNC: 4.5 G/DL (ref 3.5–5.2)
ALBUMIN UR-MCNC: <1.2 MG/DL
ALBUMIN/GLOB SERPL: 1.6 G/DL
ALP SERPL-CCNC: 88 U/L (ref 39–117)
ALT SERPL W P-5'-P-CCNC: 17 U/L (ref 1–33)
ANION GAP SERPL CALCULATED.3IONS-SCNC: 12 MMOL/L (ref 5–15)
AST SERPL-CCNC: 17 U/L (ref 1–32)
BASOPHILS # BLD AUTO: 0.02 10*3/MM3 (ref 0–0.2)
BASOPHILS NFR BLD AUTO: 0.1 % (ref 0–1.5)
BILIRUB SERPL-MCNC: <0.2 MG/DL (ref 0–1.2)
BUN SERPL-MCNC: 23 MG/DL (ref 6–20)
BUN/CREAT SERPL: 17.7 (ref 7–25)
CALCIUM SPEC-SCNC: 9.4 MG/DL (ref 8.6–10.5)
CHLORIDE SERPL-SCNC: 104 MMOL/L (ref 98–107)
CHOLEST SERPL-MCNC: 200 MG/DL (ref 0–200)
CO2 SERPL-SCNC: 25 MMOL/L (ref 22–29)
CREAT SERPL-MCNC: 1.3 MG/DL (ref 0.57–1)
DEPRECATED RDW RBC AUTO: 40.5 FL (ref 37–54)
EGFRCR SERPLBLD CKD-EPI 2021: 49.6 ML/MIN/1.73
EOSINOPHIL # BLD AUTO: 0 10*3/MM3 (ref 0–0.4)
EOSINOPHIL NFR BLD AUTO: 0 % (ref 0.3–6.2)
ERYTHROCYTE [DISTWIDTH] IN BLOOD BY AUTOMATED COUNT: 12.3 % (ref 12.3–15.4)
GLOBULIN UR ELPH-MCNC: 2.9 GM/DL
GLUCOSE SERPL-MCNC: 149 MG/DL (ref 65–99)
HBA1C MFR BLD: 6.3 % (ref 4.8–5.6)
HCT VFR BLD AUTO: 39.9 % (ref 34–46.6)
HDLC SERPL-MCNC: 96 MG/DL (ref 40–60)
HGB BLD-MCNC: 13.4 G/DL (ref 12–15.9)
IMM GRANULOCYTES # BLD AUTO: 0.13 10*3/MM3 (ref 0–0.05)
IMM GRANULOCYTES NFR BLD AUTO: 1 % (ref 0–0.5)
LDLC SERPL CALC-MCNC: 91 MG/DL (ref 0–100)
LDLC/HDLC SERPL: 0.94 {RATIO}
LYMPHOCYTES # BLD AUTO: 0.95 10*3/MM3 (ref 0.7–3.1)
LYMPHOCYTES NFR BLD AUTO: 7.1 % (ref 19.6–45.3)
MCH RBC QN AUTO: 29.9 PG (ref 26.6–33)
MCHC RBC AUTO-ENTMCNC: 33.6 G/DL (ref 31.5–35.7)
MCV RBC AUTO: 89.1 FL (ref 79–97)
MONOCYTES # BLD AUTO: 0.4 10*3/MM3 (ref 0.1–0.9)
MONOCYTES NFR BLD AUTO: 3 % (ref 5–12)
NEUTROPHILS NFR BLD AUTO: 11.87 10*3/MM3 (ref 1.7–7)
NEUTROPHILS NFR BLD AUTO: 88.8 % (ref 42.7–76)
NRBC BLD AUTO-RTO: 0 /100 WBC (ref 0–0.2)
PLATELET # BLD AUTO: 457 10*3/MM3 (ref 140–450)
PMV BLD AUTO: 10.3 FL (ref 6–12)
POTASSIUM SERPL-SCNC: 4.4 MMOL/L (ref 3.5–5.2)
PROT SERPL-MCNC: 7.4 G/DL (ref 6–8.5)
RBC # BLD AUTO: 4.48 10*6/MM3 (ref 3.77–5.28)
SODIUM SERPL-SCNC: 141 MMOL/L (ref 136–145)
T-UPTAKE NFR SERPL: 1.12 TBI (ref 0.8–1.3)
T4 SERPL-MCNC: 6.22 MCG/DL (ref 4.5–11.7)
TRIGL SERPL-MCNC: 71 MG/DL (ref 0–150)
TSH SERPL DL<=0.05 MIU/L-ACNC: 0.15 UIU/ML (ref 0.27–4.2)
VLDLC SERPL-MCNC: 13 MG/DL (ref 5–40)
WBC NRBC COR # BLD: 13.37 10*3/MM3 (ref 3.4–10.8)

## 2023-03-01 PROCEDURE — 80061 LIPID PANEL: CPT

## 2023-03-01 PROCEDURE — 82043 UR ALBUMIN QUANTITATIVE: CPT

## 2023-03-01 PROCEDURE — 99214 OFFICE O/P EST MOD 30 MIN: CPT

## 2023-03-01 PROCEDURE — 83036 HEMOGLOBIN GLYCOSYLATED A1C: CPT

## 2023-03-01 PROCEDURE — 84479 ASSAY OF THYROID (T3 OR T4): CPT

## 2023-03-01 PROCEDURE — 80050 GENERAL HEALTH PANEL: CPT

## 2023-03-01 PROCEDURE — 84436 ASSAY OF TOTAL THYROXINE: CPT

## 2023-03-01 NOTE — ASSESSMENT & PLAN NOTE
Asthma is improving with treatment.  The patient is experiencing monthly daytime asthma symptoms. She is experiencing monthly nighttime asthma symptoms.  Discussed monitoring symptoms and use of quick-relief medications and contacting us early in the course of exacerbations.  Warning signs of respiratory distress were reviewed with the patient.   Discussed medication dosage, use, side effects, and goals of treatment in detail.

## 2023-03-01 NOTE — PROGRESS NOTES
Chief Complaint  Chief Complaint   Patient presents with   • Hypertension     Follow up       Subjective      Beba Ambrose presents to [facility]  History of Present Illness  Patient presents today for follow-up visit.  She was last seen in September 2022 for hypertension, type 2 diabetes, asthma, anxiety and depression.  Since last seen she has had a surgical LEEP procedure with OB/GYN.  She was seen for an acute visit in this clinic by a different provider on 2/16/2023 for asthma exacerbation and provided a new nebulizer, started on Singulair and albuterol, prescribed Medrol Dosepak.  Additionally she was referred to allergy and asthma which she saw yesterday but they were unable to do the testing due to recent asthma exacerbation.  She will reschedule within to have further testing done.    BMI 27.37: Patient feels that she has gained weight and states that she is at her heaviest.  She does have underlying diabetes and reports that she has not been following a very good diet as she has been busy taking care of her mother, going back and forth from [place] to [place] where her mother lives.  She has recently joined a gym and is trying to increase her physical activity but has not noticed any change in her weight.  She is interested in starting a GLP-1 medication to treat her underlying diabetes and elevated BMI.    Hypertension: She is currently taking lisinopril 10 mg daily.  Blood pressure in office today is well managed at 124/82.  She denies any elevated blood pressures at home, denies any chest pain, swelling of her extremities, blurred vision.    Type 2 diabetes: She is currently taking Januvia 50 mg daily.  Most recent A1c was 6.50. She previously took metformin and could not tolerate that due to GI upset.    Anxiety/depression: She is currently taking Zoloft 150 mg daily. She has been on this for quite some time and feels that it is effective.  Patient states that she  also has ADHD and at one point was prescribed Adderall but did not continue taking this given that it is a controlled substance and required monthly follow-up exams for refills and routine urine drug screens.    Asthma: Recently seen in clinic by different provider for asthma exacerbation.  She continues to use Advair inhaler twice daily, albuterol nebulizer and inhaler as needed.  She is also now taking Singulair daily for prevention of symptoms.    Eye exam is scheduled for this coming Monday.     Mammogram was ordered in September but patient has not been able to schedule the appointment.  She plans on calling to reschedule this    Objective     Medical History:  Past Medical History:   Diagnosis Date   • Acute cystitis 07/15/2016   • ADHD (attention deficit hyperactivity disorder) 12/01/2020   • Allergies    • Anxiety    • Asthma     inhalers as needed   • Broken bones    • Depression 12/01/2020   • Diabetes mellitus, type 2 (HCC) 01/08/2021   • Essential hypertension 12/01/2020   • Glucose found in urine on examination 12/01/2020    HISTORY OF GLUCOSE IN HER URINE, WE WILL CHECK AN A1C TODAY AND WE WILL SEND A URINALYSIS FOR MICROSCOPY.   • Migraine headache      Past Surgical History:   Procedure Laterality Date   • BREAST AUGMENTATION     • LEEP N/A 9/12/2022    Procedure: LOOP ELECTROCAUTERY EXCISION PROCEDURE;  Surgeon: Esme Lambert MD;  [place] [facility] [place]  Service: Gynecology;  Laterality: N/A;   • VAGINAL BIOPSY        Social History     Tobacco Use   • Smoking status: Former     Packs/day: 0.50     Years: 15.00     Pack years: 7.50     Types: Cigarettes     Start date: 3/18/2005     Quit date: 3/1/2020     Years since quitting: 3.0   • Smokeless tobacco: Never   • Tobacco comments:     STARTED SMOKING AT AGE 2; SMOKED 10 CIGS A DAY; SOME SECOND HAND SMOKE EXPOSURE   Vaping Use   • Vaping Use: Never used   Substance Use Topics   • Alcohol use: Not Currently   • Drug use: Never     Family History  "  Problem Relation Age of Onset   • Parkinsonism Father    • Cancer Other        Medications:  Prior to Admission medications    Medication Sig Start Date End Date Taking? Authorizing Provider   albuterol (ACCUNEB) 1.25 MG/3ML nebulizer solution Take 3 mL by nebulization Every 6 (Six) Hours As Needed for Wheezing or Shortness of Air. 1/26/22   Margarita Joya APRN   albuterol sulfate  (90 Base) MCG/ACT inhaler Inhale 2 puffs Every 4 (Four) Hours As Needed for Wheezing. 2/16/23   Diallo Pozo APRN   fluticasone-salmeterol (Advair HFA) 45-21 MCG/ACT inhaler Inhale 2 puffs 2 (Two) Times a Day. 1/20/23   Phuong Garcia APRN   ibuprofen (ADVIL,MOTRIN) 600 MG tablet Take 1 tablet by mouth Every 6 (Six) Hours As Needed for Mild Pain. 9/12/22   Esme Lambert MD   Januvia 50 MG tablet TAKE 1 TABLET BY MOUTH EVERY DAY 10/31/22   Phuong Garcia APRN   lisinopril (PRINIVIL,ZESTRIL) 10 MG tablet TAKE TWO TABLETS BY MOUTH EVERY DAY 1/18/23   Phuong Garcia APRN   methylPREDNISolone (MEDROL) 4 MG dose pack Take as directed on package instructions. 2/16/23   Diallo Pozo APRN   montelukast (Singulair) 10 MG tablet Take 1 tablet by mouth Every Night. 2/16/23   Diallo Pozo APRN   sertraline (ZOLOFT) 100 MG tablet Take 1 tablet by mouth Daily. 9/7/22   Phuong Garcia APRN   sertraline (Zoloft) 50 MG tablet Take 1 tablet by mouth Daily. 9/7/22   Phuong Garcia APRN        Allergies:   Metformin    Health Maintenance Due   Topic Date Due   • Hepatitis B (1 of 3 - 3-dose series) Never done   • MAMMOGRAM  Never done   • Pneumococcal Vaccine 0-64 (1 - PCV) Never done   • TDAP/TD VACCINES (1 - Tdap) Never done   • ANNUAL PHYSICAL  Never done   • DIABETIC EYE EXAM  Never done   • URINE MICROALBUMIN  01/26/2023         Vital Signs:   /82   Pulse 95   Temp 97.7 °F (36.5 °C)   Ht 160 cm (63\")   Wt 70.1 kg (154 lb 8 oz)   SpO2 98%   BMI 27.37 " kg/m²     Wt Readings from Last 3 Encounters:   03/01/23 70.1 kg (154 lb 8 oz)   02/16/23 69.4 kg (153 lb)   09/27/22 66.7 kg (147 lb)     BP Readings from Last 3 Encounters:   03/01/23 124/82   02/16/23 122/74   09/27/22 120/75       BMI is >= 25 and <30. (Overweight) The following options were offered after discussion;: exercise counseling/recommendations and nutrition counseling/recommendations       Physical Exam  Vitals reviewed.   Constitutional:       Appearance: Normal appearance. She is well-developed.   HENT:      Head: Normocephalic and atraumatic.   Eyes:      Conjunctiva/sclera: Conjunctivae normal.      Pupils: Pupils are equal, round, and reactive to light.   Cardiovascular:      Rate and Rhythm: Normal rate and regular rhythm.      Heart sounds: No murmur heard.    No friction rub. No gallop.   Pulmonary:      Effort: Pulmonary effort is normal.      Breath sounds: Normal breath sounds. No wheezing or rhonchi.   Abdominal:      General: Bowel sounds are normal. There is no distension.      Palpations: Abdomen is soft.      Tenderness: There is no abdominal tenderness.   Skin:     General: Skin is warm and dry.   Neurological:      Mental Status: She is alert and oriented to person, place, and time.      Cranial Nerves: No cranial nerve deficit.   Psychiatric:         Mood and Affect: Mood and affect normal.         Behavior: Behavior normal.         Thought Content: Thought content normal.         Judgment: Judgment normal.          Result Review :    The following data was reviewed by AJ Perez on 03/01/23 at 14:22 EST:    Common labs    Common Labs 9/7/22 9/12/22   WBC  10.03   Hemoglobin  14.2   Hematocrit  43.3   Platelets  363   Hemoglobin A1C 6.50 (A)    (A) Abnormal value              No Images in the past 120 days found..                  Assessment and Plan    Diagnoses and all orders for this visit:    1. Annual physical exam (Primary)  -     CBC & Differential  -      Comprehensive Metabolic Panel  -     Lipid Panel  -     Hemoglobin A1c  -     Thyroid Panel With TSH    2. Essential hypertension  Assessment & Plan:  Hypertension is improving with treatment.  Continue current treatment regimen.  Dietary sodium restriction.  Weight loss.  Regular aerobic exercise.  Continue current medications.  Blood pressure will be reassessed in 3 months.    Orders:  -     CBC & Differential  -     Comprehensive Metabolic Panel  -     Lipid Panel    3. Type 2 diabetes mellitus without complication, without long-term current use of insulin (HCC)  Assessment & Plan:  Diabetes is improving with treatment.   Continue current treatment regimen.  Dietary recommendations for ADA diet.  Regular aerobic exercise.  Discussed foot care.  Reminded to get yearly retinal exam.  Diabetes will be reassessed in 3 months.    Orders:  -     Hemoglobin A1c  -     MicroAlbumin, Urine, Random - Urine, Clean Catch    4. Anxiety  Assessment & Plan:  Continue current dose of Zoloft.      5. Moderate episode of recurrent major depressive disorder (HCC)  Assessment & Plan:  Patient's depression is recurrent and is mild without psychosis. Their depression is currently active and the condition is improving with treatment. This will be reassessed at the next regular appointment. F/U as described:patient will continue current medication therapy.      6. Moderate persistent asthma without complication  Comments:  Continue use of Advair and Singulair daily, albuterol inhaler and nebulizer as needed.  Assessment & Plan:  Asthma is improving with treatment.  The patient is experiencing monthly daytime asthma symptoms. She is experiencing monthly nighttime asthma symptoms.  Discussed monitoring symptoms and use of quick-relief medications and contacting us early in the course of exacerbations.  Warning signs of respiratory distress were reviewed with the patient.   Discussed medication dosage, use, side effects, and goals of  treatment in detail.          Patient is interested in starting GLP-1 medication for better glucose control and additional benefit of weight loss.  Labs will be done today and upon review of results patient will be prescribed GLP-1 medication.  She denies a history of thyroid cancer.  We discussed possible side effects of this medication including GI upset.  Patient verbalized understanding and is in agreement with this treatment plan.    Upon starting GLP-1 medication, patient will plan on following up with me in approximately 3 months.  If A1c is at goal and GLP-1 medication is not started, patient can follow-up with me in 6 months.        Smoking Cessation:    Beba Ambrose  reports that she quit smoking about 3 years ago. Her smoking use included cigarettes. She started smoking about 17 years ago. She has a 7.50 pack-year smoking history. She has never used smokeless tobacco.            Follow Up   Return in about 3 months (around 6/1/2023) for Next scheduled follow up.  Patient was given instructions and counseling regarding her condition or for health maintenance advice. Please see specific information pulled into the AVS if appropriate.     Please note that portions of this note were completed with a voice recognition program.

## 2023-03-07 ENCOUNTER — TELEPHONE (OUTPATIENT)
Dept: FAMILY MEDICINE CLINIC | Facility: CLINIC | Age: 53
End: 2023-03-07

## 2023-03-07 NOTE — TELEPHONE ENCOUNTER
Caller: Beba Ambrose    Relationship: Self    Best call back number: [phone]    What test was performed: BLOOD WORK     When was the test performed: 3.1.2023    Additional notes: PATIENT WOULD LIKE A CALL BACK WITH THESE RESULTS.

## 2023-03-14 DIAGNOSIS — E11.65 TYPE 2 DIABETES MELLITUS WITH HYPERGLYCEMIA, WITHOUT LONG-TERM CURRENT USE OF INSULIN: Primary | ICD-10-CM

## 2023-03-14 RX ORDER — DULAGLUTIDE 0.75 MG/.5ML
0.75 INJECTION, SOLUTION SUBCUTANEOUS WEEKLY
Qty: 2 ML | Refills: 0 | Status: SHIPPED | OUTPATIENT
Start: 2023-03-14

## 2023-03-28 ENCOUNTER — OFFICE VISIT (OUTPATIENT)
Dept: OBSTETRICS AND GYNECOLOGY | Facility: CLINIC | Age: 53
End: 2023-03-28
Payer: COMMERCIAL

## 2023-03-28 VITALS
BODY MASS INDEX: 27.28 KG/M2 | DIASTOLIC BLOOD PRESSURE: 81 MMHG | WEIGHT: 154 LBS | HEART RATE: 90 BPM | SYSTOLIC BLOOD PRESSURE: 121 MMHG

## 2023-03-28 DIAGNOSIS — D06.9 SEVERE DYSPLASIA OF CERVIX (CIN III): Primary | ICD-10-CM

## 2023-03-28 PROCEDURE — 87624 HPV HI-RISK TYP POOLED RSLT: CPT | Performed by: OBSTETRICS & GYNECOLOGY

## 2023-03-28 PROCEDURE — 88305 TISSUE EXAM BY PATHOLOGIST: CPT | Performed by: OBSTETRICS & GYNECOLOGY

## 2023-03-28 PROCEDURE — 88342 IMHCHEM/IMCYTCHM 1ST ANTB: CPT | Performed by: OBSTETRICS & GYNECOLOGY

## 2023-03-28 PROCEDURE — G0123 SCREEN CERV/VAG THIN LAYER: HCPCS | Performed by: OBSTETRICS & GYNECOLOGY

## 2023-03-28 NOTE — ASSESSMENT & PLAN NOTE
Adequate colposcopy today with no acetowhite changes.  Pap smear with HPV and ECC also obtained.  Will await results and plan accordingly

## 2023-03-28 NOTE — PROGRESS NOTES
Colposcopy    Pregnant: No  Birth control: NA   Tobacco/Nicotine use:  No  Pap result: ARETHA-3 status post LEEP procedure  Uhcg:  Not done  Consent signed: Yes    CC: Presents for colposcopy     Procedure reviewed in detail.  She understands the potential risks include, but are not limited to, pain, bleeding, and infection.  Her questions have been answered.        Subjective:  Patient is without any complaints    Objective:  /81   Pulse 90   Wt 69.9 kg (154 lb)   Breastfeeding No   BMI 27.28 kg/m²   External genitalia- Without lesion    Perineum- Without lesion  Vagina- Without lesion   Cvx- Adequate 100%TZ seen, No lesions seen, ECC performed, Pap with cotesting obtained    Physical Exam (pics..)    Assessment and Plan:  Diagnoses and all orders for this visit:    1. Severe dysplasia of cervix (ARETHA III) (Primary)  Assessment & Plan:  Adequate colposcopy today with no acetowhite changes.  Pap smear with HPV and ECC also obtained.  Will await results and plan accordingly          Counseling:    We discussed her Pap diagnosis and HPV in detail.  HPV incidence, transmission, course, remission, progression, types, cervical cancer, genital warts, monitoring, and importance of compliance were reviewed.      She understands the need for continued follow up until she is cleared to return to routine screening pap smears.  She understands the importance of follow up and consequences with lack of follow up (i.e. potential for cervical cancer).  Follow up usually ranges every 6months - 12months.      PRECAUTIONS - It is common to have bright red spotting and dark discharge after today.  If she has had cervical biopsies, she is to avoid intercourse or tampons as directed for 7 days.  She can use OTC pain relievers prn.  She needs to return to office or ER (if after hours/weekends) if she has pelvic pain, bleeding > 1 pad/2 hours, discharge that has a bad vaginal odor or vaginal itching, fever > 101.5, or any other  concerns.            Follow Up:  Return for We will call patient with results.      Esme Lambert MD  03/28/2023

## 2023-03-31 LAB
CYTO UR: NORMAL
LAB AP CASE REPORT: NORMAL
LAB AP CLINICAL INFORMATION: NORMAL
LAB AP SPECIAL STAINS: NORMAL
PATH REPORT.FINAL DX SPEC: NORMAL
PATH REPORT.GROSS SPEC: NORMAL

## 2023-04-03 LAB
CYTOLOGIST CVX/VAG CYTO: NORMAL
CYTOLOGY CVX/VAG DOC CYTO: NORMAL
CYTOLOGY CVX/VAG DOC THIN PREP: NORMAL
DX ICD CODE: NORMAL
HIV 1 & 2 AB SER-IMP: NORMAL
HPV I/H RISK 4 DNA CVX QL PROBE+SIG AMP: NEGATIVE
OTHER STN SPEC: NORMAL
STAT OF ADQ CVX/VAG CYTO-IMP: NORMAL

## 2023-04-04 ENCOUNTER — TELEPHONE (OUTPATIENT)
Dept: OBSTETRICS AND GYNECOLOGY | Facility: CLINIC | Age: 53
End: 2023-04-04
Payer: COMMERCIAL

## 2023-04-04 NOTE — TELEPHONE ENCOUNTER
----- Message from Esme Lambert MD sent at 4/3/2023  3:40 PM EDT -----  Please inform patient her pap smear is negative with negative HPV.  Her biopsy was subtle ARETHA 1.  Plan to repeat pap smear in one year

## 2023-04-05 NOTE — TELEPHONE ENCOUNTER
Patient called back and advised of her results and recommendations. She understands to repeat her pap in 12 months.

## 2023-04-12 DIAGNOSIS — E11.65 TYPE 2 DIABETES MELLITUS WITH HYPERGLYCEMIA, WITHOUT LONG-TERM CURRENT USE OF INSULIN: ICD-10-CM

## 2023-04-12 RX ORDER — DULAGLUTIDE 0.75 MG/.5ML
0.75 INJECTION, SOLUTION SUBCUTANEOUS WEEKLY
Qty: 2 ML | Refills: 2 | Status: SHIPPED | OUTPATIENT
Start: 2023-04-12 | End: 2023-04-17

## 2023-04-17 ENCOUNTER — OFFICE VISIT (OUTPATIENT)
Dept: FAMILY MEDICINE CLINIC | Facility: CLINIC | Age: 53
End: 2023-04-17
Payer: COMMERCIAL

## 2023-04-17 VITALS
DIASTOLIC BLOOD PRESSURE: 86 MMHG | OXYGEN SATURATION: 97 % | TEMPERATURE: 97.3 F | SYSTOLIC BLOOD PRESSURE: 132 MMHG | HEART RATE: 107 BPM | HEIGHT: 63 IN | WEIGHT: 155.2 LBS | BODY MASS INDEX: 27.5 KG/M2

## 2023-04-17 DIAGNOSIS — E66.3 OVERWEIGHT WITH BODY MASS INDEX (BMI) OF 27 TO 27.9 IN ADULT: ICD-10-CM

## 2023-04-17 DIAGNOSIS — I10 ESSENTIAL HYPERTENSION: ICD-10-CM

## 2023-04-17 DIAGNOSIS — E11.65 TYPE 2 DIABETES MELLITUS WITH HYPERGLYCEMIA, WITHOUT LONG-TERM CURRENT USE OF INSULIN: Primary | ICD-10-CM

## 2023-04-17 RX ORDER — DULAGLUTIDE 1.5 MG/.5ML
1.5 INJECTION, SOLUTION SUBCUTANEOUS WEEKLY
Qty: 2 ML | Refills: 3 | Status: SHIPPED | OUTPATIENT
Start: 2023-04-17

## 2023-04-17 NOTE — PROGRESS NOTES
Chief Complaint  Chief Complaint   Patient presents with   • Diabetes     Follow up    • Hypertension     Follow up        Subjective      Beba Ambrose presents to [facility]  History of Present Illness    Diabetes: Patient presents today to follow up after initiation of Trulicity.  She denies any negative side effects, denies any GI distress.  A1c had improved when it was last checked.  She does not routinely monitor her blood sugar at home but denies any episodes of hypoglycemia.      Hypertension: Blood pressure is stable on current medication of lisinopril 10 mg daily.  She denies any chest pain, blurred vision, swelling to her extremities.    .Objective     Medical History:  Past Medical History:   Diagnosis Date   • Abnormal Pap smear of cervix    • Acute cystitis 07/15/2016   • ADHD (attention deficit hyperactivity disorder) 12/01/2020   • Allergic     Had my whole life used to get shots   • Allergies    • Anxiety    • Asthma     inhalers as needed   • Broken bones    • Depression 12/01/2020   • Diabetes mellitus, type 2 01/08/2021   • Essential hypertension 12/01/2020   • Glucose found in urine on examination 12/01/2020    HISTORY OF GLUCOSE IN HER URINE, WE WILL CHECK AN A1C TODAY AND WE WILL SEND A URINALYSIS FOR MICROSCOPY.   • HPV (human papilloma virus) infection    • Migraine headache      Past Surgical History:   Procedure Laterality Date   • BREAST AUGMENTATION     • BREAST SURGERY  2009   • LEEP N/A 09/12/2022    Procedure: LOOP ELECTROCAUTERY EXCISION PROCEDURE;  Surgeon: Esme Lambert MD;  [place] [facility] [place] [facility] [place]  Service: Gynecology;  Laterality: N/A;   • VAGINAL BIOPSY        Social History     Tobacco Use   • Smoking status: Former     Packs/day: 0.50     Years: 15.00     Pack years: 7.50     Types: Cigarettes     Start date: 3/18/2005     Quit date: 3/1/2020     Years since quitting: 3.1   • Smokeless tobacco: Never   • Tobacco comments:     STARTED SMOKING  AT AGE 2; SMOKED 10 CIGS A DAY; SOME SECOND HAND SMOKE EXPOSURE   Vaping Use   • Vaping Use: Never used   Substance Use Topics   • Alcohol use: Yes     Comment: Rarely drink   • Drug use: Never     Family History   Problem Relation Age of Onset   • Parkinsonism Father    • Other Father         Parkinson’s   • Cancer Other    • Alcohol abuse Mother    • COPD Mother    • Depression Mother    • Learning disabilities Brother    • Ovarian cancer Neg Hx    • Uterine cancer Neg Hx    • Breast cancer Neg Hx    • Melanoma Neg Hx    • Colon cancer Neg Hx    • Prostate cancer Neg Hx    • Clotting disorder Neg Hx        Medications:  Prior to Admission medications    Medication Sig Start Date End Date Taking? Authorizing Provider   albuterol (ACCUNEB) 1.25 MG/3ML nebulizer solution Take 3 mL by nebulization Every 6 (Six) Hours As Needed for Wheezing or Shortness of Air. 1/26/22  Yes Margarita Joya APRN   albuterol sulfate  (90 Base) MCG/ACT inhaler Inhale 2 puffs Every 4 (Four) Hours As Needed for Wheezing. 2/16/23  Yes Diallo Pozo APRN   fluticasone-salmeterol (Advair HFA) 45-21 MCG/ACT inhaler Inhale 2 puffs 2 (Two) Times a Day. 1/20/23  Yes Phuong Garcia APRN   ibuprofen (ADVIL,MOTRIN) 600 MG tablet Take 1 tablet by mouth Every 6 (Six) Hours As Needed for Mild Pain. 9/12/22  Yes Esme Lambert MD   lisinopril (PRINIVIL,ZESTRIL) 10 MG tablet TAKE TWO TABLETS BY MOUTH EVERY DAY 1/18/23  Yes Phuong Garcia APRN   montelukast (Singulair) 10 MG tablet Take 1 tablet by mouth Every Night. 2/16/23  Yes Diallo Pozo APRN   sertraline (ZOLOFT) 100 MG tablet Take 1 tablet by mouth Daily. 9/7/22  Yes Phuong Garcia APRN   sertraline (Zoloft) 50 MG tablet Take 1 tablet by mouth Daily. 9/7/22  Yes Phuong Garcia APRN   Trulicity 0.75 MG/0.5ML solution pen-injector Inject 0.75 mg under the skin into the appropriate area as directed 1 (One) Time Per Week. 4/12/23  Yes  "Garcia, AJ Baxter   Ivanuvia 50 MG tablet TAKE 1 TABLET BY MOUTH EVERY DAY  Patient not taking: Reported on 4/17/2023 10/31/22   Phuong Garcia AJ Bhatia        Allergies:   Metformin    Health Maintenance Due   Topic Date Due   • Hepatitis B (1 of 3 - 3-dose series) Never done   • MAMMOGRAM  Never done   • ANNUAL PHYSICAL  Never done   • DIABETIC EYE EXAM  Never done         Vital Signs:   /86   Pulse 107   Temp 97.3 °F (36.3 °C)   Ht 160 cm (63\")   Wt 70.4 kg (155 lb 3.2 oz)   SpO2 97%   BMI 27.49 kg/m²     Wt Readings from Last 3 Encounters:   04/17/23 70.4 kg (155 lb 3.2 oz)   03/28/23 69.9 kg (154 lb)   03/01/23 70.1 kg (154 lb 8 oz)     BP Readings from Last 3 Encounters:   04/17/23 132/86   03/28/23 121/81   03/01/23 124/82       BMI is >= 25 and <30. (Overweight) The following options were offered after discussion;: exercise counseling/recommendations and nutrition counseling/recommendations       Physical Exam  Vitals reviewed.   Constitutional:       Appearance: Normal appearance. She is well-developed.   HENT:      Head: Normocephalic and atraumatic.   Eyes:      Conjunctiva/sclera: Conjunctivae normal.      Pupils: Pupils are equal, round, and reactive to light.   Cardiovascular:      Rate and Rhythm: Normal rate and regular rhythm.      Heart sounds: No murmur heard.    No friction rub. No gallop.   Pulmonary:      Effort: Pulmonary effort is normal.      Breath sounds: Normal breath sounds. No wheezing or rhonchi.   Abdominal:      General: Bowel sounds are normal. There is no distension.      Palpations: Abdomen is soft.      Tenderness: There is no abdominal tenderness.   Skin:     General: Skin is warm and dry.   Neurological:      Mental Status: She is alert and oriented to person, place, and time.      Cranial Nerves: No cranial nerve deficit.   Psychiatric:         Mood and Affect: Mood and affect normal.         Behavior: Behavior normal.         Thought Content: " Thought content normal.         Judgment: Judgment normal.          Result Review :    The following data was reviewed by AJ Perez on 04/17/23 at 16:29 EDT:    Common labs        9/7/2022    11:36 9/12/2022    10:11 3/1/2023    13:18   Common Labs   Glucose   149     BUN   23     Creatinine   1.30     Sodium   141     Potassium   4.4     Chloride   104     Calcium   9.4     Albumin   4.5     Total Bilirubin   <0.2     Alkaline Phosphatase   88     AST (SGOT)   17     ALT (SGPT)   17     WBC  10.03   13.37     Hemoglobin  14.2   13.4     Hematocrit  43.3   39.9     Platelets  363   457     Total Cholesterol   200     Triglycerides   71     HDL Cholesterol   96     LDL Cholesterol    91     Hemoglobin A1C 6.50    6.30     Microalbumin, Urine   <1.2         No Images in the past 120 days found..                  Assessment and Plan    Diagnoses and all orders for this visit:    1. Type 2 diabetes mellitus with hyperglycemia, without long-term current use of insulin (Primary)  Assessment & Plan:  Diabetes is improving with treatment.   Continue current treatment regimen.  Dietary recommendations for ADA diet.  Discussed foot care.  Reminded to get yearly retinal exam.  Diabetes will be reassessed in 3 months.    Orders:  -     Dulaglutide (Trulicity) 1.5 MG/0.5ML solution pen-injector; Inject 1.5 mg under the skin into the appropriate area as directed 1 (One) Time Per Week.  Dispense: 2 mL; Refill: 3    2. Essential hypertension  Assessment & Plan:  Hypertension is improving with treatment.  Continue current treatment regimen.  Dietary sodium restriction.  Continue current medications.  Ambulatory blood pressure monitoring.  Blood pressure will be reassessed at the next regular appointment.      3. Overweight with body mass index (BMI) of 27 to 27.9 in adult  Assessment & Plan:  Patient's (Body mass index is 27.49 kg/m².) indicates that they are overweight with health conditions that include  hypertension and diabetes mellitus . Weight is improving with lifestyle modifications. BMI is is above average; BMI management plan is completed. We discussed portion control and increasing exercise.     Trulicity increased to 1.5 mg weekly.  Patient encouraged to follow-up with me in 3 months or sooner.  At that time we will check labs including hemoglobin A1c, lipid panel and chemistry panel to ensure that decreased GFR has resolved.        Smoking Cessation:    Beba Ambrose  reports that she quit smoking about 3 years ago. Her smoking use included cigarettes. She started smoking about 18 years ago. She has a 7.50 pack-year smoking history. She has never used smokeless tobacco.          Follow Up   Return in about 3 months (around 7/17/2023) for Next scheduled follow up.  Patient was given instructions and counseling regarding her condition or for health maintenance advice. Please see specific information pulled into the AVS if appropriate.     Please note that portions of this note were completed with a voice recognition program.

## 2023-04-17 NOTE — ASSESSMENT & PLAN NOTE
Diabetes is improving with treatment.   Continue current treatment regimen.  Dietary recommendations for ADA diet.  Discussed foot care.  Reminded to get yearly retinal exam.  Diabetes will be reassessed in 3 months.

## 2023-05-01 ENCOUNTER — OFFICE VISIT (OUTPATIENT)
Dept: FAMILY MEDICINE CLINIC | Facility: CLINIC | Age: 53
End: 2023-05-01
Payer: COMMERCIAL

## 2023-05-01 VITALS
BODY MASS INDEX: 26.93 KG/M2 | HEART RATE: 92 BPM | DIASTOLIC BLOOD PRESSURE: 68 MMHG | WEIGHT: 152 LBS | OXYGEN SATURATION: 100 % | HEIGHT: 63 IN | SYSTOLIC BLOOD PRESSURE: 104 MMHG | TEMPERATURE: 97.9 F

## 2023-05-01 DIAGNOSIS — J45.41 MODERATE PERSISTENT ASTHMA WITH ACUTE EXACERBATION: Primary | ICD-10-CM

## 2023-05-01 DIAGNOSIS — E66.3 OVERWEIGHT WITH BODY MASS INDEX (BMI) OF 26 TO 26.9 IN ADULT: ICD-10-CM

## 2023-05-01 RX ORDER — FAMOTIDINE 20 MG/1
20 TABLET, FILM COATED ORAL 2 TIMES DAILY
Qty: 60 TABLET | Refills: 0 | Status: SHIPPED | OUTPATIENT
Start: 2023-05-01

## 2023-05-01 RX ORDER — FEXOFENADINE HCL 180 MG/1
180 TABLET ORAL DAILY
Qty: 30 TABLET | Refills: 1 | Status: SHIPPED | OUTPATIENT
Start: 2023-05-01

## 2023-05-01 RX ORDER — METHYLPREDNISOLONE SODIUM SUCCINATE 125 MG/2ML
125 INJECTION, POWDER, LYOPHILIZED, FOR SOLUTION INTRAMUSCULAR; INTRAVENOUS ONCE
Status: COMPLETED | OUTPATIENT
Start: 2023-05-01 | End: 2023-05-01

## 2023-05-01 RX ORDER — IPRATROPIUM BROMIDE 21 UG/1
2 SPRAY, METERED NASAL 3 TIMES DAILY PRN
Qty: 30 ML | Refills: 2 | Status: SHIPPED | OUTPATIENT
Start: 2023-05-01

## 2023-05-01 RX ADMIN — METHYLPREDNISOLONE SODIUM SUCCINATE 125 MG: 125 INJECTION, POWDER, LYOPHILIZED, FOR SOLUTION INTRAMUSCULAR; INTRAVENOUS at 11:08

## 2023-05-01 NOTE — PROGRESS NOTES
Chief Complaint  Asthma (Pt reports wheezing, has used Nebulizer and Inhaler with minimal relief)    Subjective        Medical History: has a past medical history of Abnormal Pap smear of cervix, Acute cystitis (07/15/2016), ADHD (attention deficit hyperactivity disorder) (12/01/2020), Allergic, Allergies, Anxiety, Asthma, Broken bones, Depression (12/01/2020), Diabetes mellitus, type 2 (01/08/2021), Essential hypertension (12/01/2020), Glucose found in urine on examination (12/01/2020), HPV (human papilloma virus) infection, and Migraine headache.     Surgical History: has a past surgical history that includes Breast Augmentation; Vaginal Biopsy; LEEP (N/A, 09/12/2022); and Breast surgery (2009).     Family History: family history includes Alcohol abuse in her mother; COPD in her mother; Cancer in an other family member; Depression in her mother; Learning disabilities in her brother; Other in her father; Parkinsonism in her father.     Social History: reports that she quit smoking about 3 years ago. Her smoking use included cigarettes. She started smoking about 18 years ago. She has a 7.50 pack-year smoking history. She has never used smokeless tobacco. She reports current alcohol use. She reports that she does not use drugs.    Beba Ambrose presents to [facility]  History of Present Illness  Patient has had issues with asthma since February 2023.  She states it has been persistent, she is wheezing daily.  Patient states she has taken her albuterol nebulizer as needed, and Advair but does not feel like anything is helping.  Patient also currently on montelukast nightly.  Patient states she cannot go back and get her allergy injections because she continues to wheeze.  Patient was seen per me February 2023 at that time she was having issues with asthma she was given low-dose steroid due to her diabetes, and nebulizer.  Patient states her symptoms never really did resolve completely.   "She denies any fever or chills.  Does complain of a dry cough.  Objective   Vital Signs:   /68 (BP Location: Left arm, Patient Position: Sitting, Cuff Size: Adult)   Pulse 92   Temp 97.9 °F (36.6 °C) (Temporal)   Ht 160 cm (63\")   Wt 68.9 kg (152 lb)   SpO2 100%   BMI 26.93 kg/m²       Wt Readings from Last 3 Encounters:   05/01/23 68.9 kg (152 lb)   04/17/23 70.4 kg (155 lb 3.2 oz)   03/28/23 69.9 kg (154 lb)        BP Readings from Last 3 Encounters:   05/01/23 104/68   04/17/23 132/86   03/28/23 121/81               Physical Exam  Vitals reviewed.   Constitutional:       Appearance: Normal appearance. She is well-developed.      Comments: Overweight, BMI 26.93   HENT:      Head: Normocephalic and atraumatic.      Right Ear: Tympanic membrane and external ear normal.      Left Ear: Tympanic membrane and external ear normal.   Eyes:      Conjunctiva/sclera: Conjunctivae normal.      Pupils: Pupils are equal, round, and reactive to light.   Cardiovascular:      Rate and Rhythm: Normal rate and regular rhythm.      Heart sounds: No murmur heard.  Pulmonary:      Effort: Pulmonary effort is normal.      Breath sounds: Normal breath sounds. No wheezing or rhonchi.   Skin:     General: Skin is warm and dry.   Neurological:      Mental Status: She is alert and oriented to person, place, and time.   Psychiatric:         Mood and Affect: Mood and affect normal.         Behavior: Behavior normal.         Thought Content: Thought content normal.         Judgment: Judgment normal.        Result Review :  {The following data was reviewed by AJ Cunningham on 05/01/2023.  Common labs        9/7/2022    11:36 9/12/2022    10:11 3/1/2023    13:18   Common Labs   Glucose   149     BUN   23     Creatinine   1.30     Sodium   141     Potassium   4.4     Chloride   104     Calcium   9.4     Albumin   4.5     Total Bilirubin   <0.2     Alkaline Phosphatase   88     AST (SGOT)   17     ALT (SGPT)   17     WBC  " 10.03   13.37     Hemoglobin  14.2   13.4     Hematocrit  43.3   39.9     Platelets  363   457     Total Cholesterol   200     Triglycerides   71     HDL Cholesterol   96     LDL Cholesterol    91     Hemoglobin A1C 6.50    6.30     Microalbumin, Urine   <1.2       Data reviewed: Previous office note in February 2023            Current Outpatient Medications on File Prior to Visit   Medication Sig Dispense Refill   • albuterol (ACCUNEB) 1.25 MG/3ML nebulizer solution Take 3 mL by nebulization Every 6 (Six) Hours As Needed for Wheezing or Shortness of Air. 60 each 12   • albuterol sulfate  (90 Base) MCG/ACT inhaler Inhale 2 puffs Every 4 (Four) Hours As Needed for Wheezing. 18 g 2   • Dulaglutide (Trulicity) 1.5 MG/0.5ML solution pen-injector Inject 1.5 mg under the skin into the appropriate area as directed 1 (One) Time Per Week. 2 mL 3   • fluticasone-salmeterol (Advair HFA) 45-21 MCG/ACT inhaler Inhale 2 puffs 2 (Two) Times a Day. 12 g 6   • ibuprofen (ADVIL,MOTRIN) 600 MG tablet Take 1 tablet by mouth Every 6 (Six) Hours As Needed for Mild Pain. 40 tablet 0   • lisinopril (PRINIVIL,ZESTRIL) 10 MG tablet TAKE TWO TABLETS BY MOUTH EVERY DAY 30 tablet 5   • montelukast (Singulair) 10 MG tablet Take 1 tablet by mouth Every Night. 90 tablet 1   • sertraline (ZOLOFT) 100 MG tablet Take 1 tablet by mouth Daily. 90 tablet 1   • sertraline (Zoloft) 50 MG tablet Take 1 tablet by mouth Daily. 90 tablet 1     No current facility-administered medications on file prior to visit.        Assessment and Plan  Diagnoses and all orders for this visit:    1. Moderate persistent asthma with acute exacerbation (Primary)  Comments:  Patient given 1 month sample of Trelegy 200 mg, discussed to discontinue Advair at this time, started on Pepcid H2 blocker, antihistamine, Atrovent nasal spray patient given Solu-Medrol injection in office, discussed CT in 1 month sooner if symptoms worsen.  Patient verbalized understanding agreeable  treatment plan.  Orders:  -     methylPREDNISolone sodium succinate (SOLU-Medrol) injection 125 mg    2. Overweight with body mass index (BMI) of 26 to 26.9 in adult    Other orders  -     ipratropium (ATROVENT) 0.03 % nasal spray; 2 sprays into the nostril(s) as directed by provider 3 (Three) Times a Day As Needed for Rhinitis.  Dispense: 30 mL; Refill: 2  -     famotidine (Pepcid) 20 MG tablet; Take 1 tablet by mouth 2 (Two) Times a Day.  Dispense: 60 tablet; Refill: 0  -     fexofenadine (Allegra Allergy) 180 MG tablet; Take 1 tablet by mouth Daily.  Dispense: 30 tablet; Refill: 1        Follow Up   Return in about 1 month (around 6/1/2023) for Recheck.  Patient was given instructions and counseling regarding her condition or for health maintenance advice. Please see specific information pulled into the AVS if appropriate.       Part of this note may be electronic transcription/translation of spoken language to printed text using the Dragon dictation system

## 2023-05-11 DIAGNOSIS — F33.1 MODERATE EPISODE OF RECURRENT MAJOR DEPRESSIVE DISORDER: ICD-10-CM

## 2023-05-11 DIAGNOSIS — F41.9 ANXIETY: ICD-10-CM

## 2023-05-11 RX ORDER — SERTRALINE HYDROCHLORIDE 100 MG/1
100 TABLET, FILM COATED ORAL DAILY
Qty: 90 TABLET | Refills: 1 | OUTPATIENT
Start: 2023-05-11

## 2023-05-11 RX ORDER — SERTRALINE HYDROCHLORIDE 100 MG/1
100 TABLET, FILM COATED ORAL DAILY
Qty: 90 TABLET | Refills: 1 | Status: SHIPPED | OUTPATIENT
Start: 2023-05-11

## 2023-05-26 DIAGNOSIS — E11.65 TYPE 2 DIABETES MELLITUS WITH HYPERGLYCEMIA, WITHOUT LONG-TERM CURRENT USE OF INSULIN: ICD-10-CM

## 2023-05-26 RX ORDER — DULAGLUTIDE 1.5 MG/.5ML
1.5 INJECTION, SOLUTION SUBCUTANEOUS WEEKLY
Qty: 2 ML | Refills: 3 | Status: SHIPPED | OUTPATIENT
Start: 2023-05-26

## 2023-05-26 RX ORDER — LISINOPRIL 10 MG/1
20 TABLET ORAL DAILY
Qty: 30 TABLET | Refills: 5 | Status: SHIPPED | OUTPATIENT
Start: 2023-05-26

## 2023-08-25 DIAGNOSIS — E11.65 TYPE 2 DIABETES MELLITUS WITH HYPERGLYCEMIA, WITHOUT LONG-TERM CURRENT USE OF INSULIN: ICD-10-CM

## 2023-08-25 DIAGNOSIS — F33.1 MODERATE EPISODE OF RECURRENT MAJOR DEPRESSIVE DISORDER: ICD-10-CM

## 2023-08-25 DIAGNOSIS — F41.9 ANXIETY: ICD-10-CM

## 2023-08-25 RX ORDER — DULAGLUTIDE 1.5 MG/.5ML
1.5 INJECTION, SOLUTION SUBCUTANEOUS WEEKLY
Qty: 2 ML | Refills: 3 | Status: SHIPPED | OUTPATIENT
Start: 2023-08-25

## 2023-08-25 RX ORDER — SERTRALINE HYDROCHLORIDE 100 MG/1
100 TABLET, FILM COATED ORAL DAILY
Qty: 90 TABLET | Refills: 1 | Status: SHIPPED | OUTPATIENT
Start: 2023-08-25

## 2023-08-25 RX ORDER — FEXOFENADINE HCL 180 MG/1
180 TABLET ORAL DAILY
Qty: 30 TABLET | Refills: 1 | Status: SHIPPED | OUTPATIENT
Start: 2023-08-25

## 2023-09-27 RX ORDER — LISINOPRIL 10 MG/1
20 TABLET ORAL DAILY
Qty: 30 TABLET | Refills: 5 | Status: SHIPPED | OUTPATIENT
Start: 2023-09-27

## 2023-10-02 ENCOUNTER — OFFICE VISIT (OUTPATIENT)
Dept: FAMILY MEDICINE CLINIC | Facility: CLINIC | Age: 53
End: 2023-10-02
Payer: COMMERCIAL

## 2023-10-02 VITALS
OXYGEN SATURATION: 96 % | BODY MASS INDEX: 25.27 KG/M2 | WEIGHT: 142.6 LBS | HEART RATE: 96 BPM | DIASTOLIC BLOOD PRESSURE: 84 MMHG | SYSTOLIC BLOOD PRESSURE: 144 MMHG | HEIGHT: 63 IN | TEMPERATURE: 98.2 F

## 2023-10-02 DIAGNOSIS — Z12.31 ENCOUNTER FOR SCREENING MAMMOGRAM FOR MALIGNANT NEOPLASM OF BREAST: ICD-10-CM

## 2023-10-02 DIAGNOSIS — Z00.00 ANNUAL PHYSICAL EXAM: Primary | ICD-10-CM

## 2023-10-02 DIAGNOSIS — E78.2 MIXED HYPERLIPIDEMIA: ICD-10-CM

## 2023-10-02 DIAGNOSIS — N95.1 HOT FLASHES DUE TO MENOPAUSE: ICD-10-CM

## 2023-10-02 DIAGNOSIS — J45.20 MILD INTERMITTENT ASTHMA WITHOUT COMPLICATION: ICD-10-CM

## 2023-10-02 DIAGNOSIS — N30.01 ACUTE CYSTITIS WITH HEMATURIA: ICD-10-CM

## 2023-10-02 DIAGNOSIS — I10 ESSENTIAL HYPERTENSION: ICD-10-CM

## 2023-10-02 DIAGNOSIS — R35.0 URINARY FREQUENCY: ICD-10-CM

## 2023-10-02 DIAGNOSIS — E11.65 TYPE 2 DIABETES MELLITUS WITH HYPERGLYCEMIA, WITHOUT LONG-TERM CURRENT USE OF INSULIN: ICD-10-CM

## 2023-10-02 LAB
ALBUMIN SERPL-MCNC: 4.5 G/DL (ref 3.5–5.2)
ALBUMIN/GLOB SERPL: 1.9 G/DL
ALP SERPL-CCNC: 105 U/L (ref 39–117)
ALT SERPL W P-5'-P-CCNC: 34 U/L (ref 1–33)
ANION GAP SERPL CALCULATED.3IONS-SCNC: 10.6 MMOL/L (ref 5–15)
AST SERPL-CCNC: 36 U/L (ref 1–32)
BASOPHILS # BLD AUTO: 0.08 10*3/MM3 (ref 0–0.2)
BASOPHILS NFR BLD AUTO: 1.1 % (ref 0–1.5)
BILIRUB BLD-MCNC: ABNORMAL MG/DL
BILIRUB SERPL-MCNC: 0.2 MG/DL (ref 0–1.2)
BUN SERPL-MCNC: 12 MG/DL (ref 6–20)
BUN/CREAT SERPL: 18.2 (ref 7–25)
CALCIUM SPEC-SCNC: 9.2 MG/DL (ref 8.6–10.5)
CHLORIDE SERPL-SCNC: 108 MMOL/L (ref 98–107)
CHOLEST SERPL-MCNC: 182 MG/DL (ref 0–200)
CLARITY, POC: CLEAR
CO2 SERPL-SCNC: 25.4 MMOL/L (ref 22–29)
COLOR UR: YELLOW
CREAT SERPL-MCNC: 0.66 MG/DL (ref 0.57–1)
DEPRECATED RDW RBC AUTO: 45.2 FL (ref 37–54)
EGFRCR SERPLBLD CKD-EPI 2021: 105.7 ML/MIN/1.73
EOSINOPHIL # BLD AUTO: 0.83 10*3/MM3 (ref 0–0.4)
EOSINOPHIL NFR BLD AUTO: 11.1 % (ref 0.3–6.2)
ERYTHROCYTE [DISTWIDTH] IN BLOOD BY AUTOMATED COUNT: 14.1 % (ref 12.3–15.4)
GLOBULIN UR ELPH-MCNC: 2.4 GM/DL
GLUCOSE SERPL-MCNC: 115 MG/DL (ref 65–99)
GLUCOSE UR STRIP-MCNC: NEGATIVE MG/DL
HBA1C MFR BLD: 6.3 % (ref 4.8–5.6)
HCT VFR BLD AUTO: 40.9 % (ref 34–46.6)
HDLC SERPL-MCNC: 82 MG/DL (ref 40–60)
HGB BLD-MCNC: 13.8 G/DL (ref 12–15.9)
IMM GRANULOCYTES # BLD AUTO: 0.01 10*3/MM3 (ref 0–0.05)
IMM GRANULOCYTES NFR BLD AUTO: 0.1 % (ref 0–0.5)
KETONES UR QL: ABNORMAL
LDLC SERPL CALC-MCNC: 85 MG/DL (ref 0–100)
LDLC/HDLC SERPL: 1.02 {RATIO}
LEUKOCYTE EST, POC: ABNORMAL
LYMPHOCYTES # BLD AUTO: 1.64 10*3/MM3 (ref 0.7–3.1)
LYMPHOCYTES NFR BLD AUTO: 22 % (ref 19.6–45.3)
MCH RBC QN AUTO: 30 PG (ref 26.6–33)
MCHC RBC AUTO-ENTMCNC: 33.7 G/DL (ref 31.5–35.7)
MCV RBC AUTO: 88.9 FL (ref 79–97)
MONOCYTES # BLD AUTO: 0.53 10*3/MM3 (ref 0.1–0.9)
MONOCYTES NFR BLD AUTO: 7.1 % (ref 5–12)
NEUTROPHILS NFR BLD AUTO: 4.36 10*3/MM3 (ref 1.7–7)
NEUTROPHILS NFR BLD AUTO: 58.6 % (ref 42.7–76)
NITRITE UR-MCNC: POSITIVE MG/ML
NRBC BLD AUTO-RTO: 0 /100 WBC (ref 0–0.2)
PH UR: 5.5 [PH] (ref 5–8)
PLATELET # BLD AUTO: 466 10*3/MM3 (ref 140–450)
PMV BLD AUTO: 10.1 FL (ref 6–12)
POTASSIUM SERPL-SCNC: 4.2 MMOL/L (ref 3.5–5.2)
PROT SERPL-MCNC: 6.9 G/DL (ref 6–8.5)
PROT UR STRIP-MCNC: ABNORMAL MG/DL
RBC # BLD AUTO: 4.6 10*6/MM3 (ref 3.77–5.28)
RBC # UR STRIP: ABNORMAL /UL
SODIUM SERPL-SCNC: 144 MMOL/L (ref 136–145)
SP GR UR: 1.02 (ref 1–1.03)
TRIGL SERPL-MCNC: 83 MG/DL (ref 0–150)
TSH SERPL DL<=0.05 MIU/L-ACNC: 0.83 UIU/ML (ref 0.27–4.2)
UROBILINOGEN UR QL: NORMAL
VLDLC SERPL-MCNC: 15 MG/DL (ref 5–40)
WBC NRBC COR # BLD: 7.45 10*3/MM3 (ref 3.4–10.8)

## 2023-10-02 PROCEDURE — 87086 URINE CULTURE/COLONY COUNT: CPT

## 2023-10-02 PROCEDURE — 87186 SC STD MICRODIL/AGAR DIL: CPT

## 2023-10-02 PROCEDURE — 80061 LIPID PANEL: CPT

## 2023-10-02 PROCEDURE — 83036 HEMOGLOBIN GLYCOSYLATED A1C: CPT

## 2023-10-02 PROCEDURE — 87088 URINE BACTERIA CULTURE: CPT

## 2023-10-02 PROCEDURE — 80050 GENERAL HEALTH PANEL: CPT

## 2023-10-02 RX ORDER — NITROFURANTOIN 25; 75 MG/1; MG/1
100 CAPSULE ORAL 2 TIMES DAILY
Qty: 10 CAPSULE | Refills: 0 | Status: SHIPPED | OUTPATIENT
Start: 2023-10-02 | End: 2023-10-07

## 2023-10-03 ENCOUNTER — PRIOR AUTHORIZATION (OUTPATIENT)
Dept: FAMILY MEDICINE CLINIC | Facility: CLINIC | Age: 53
End: 2023-10-03
Payer: COMMERCIAL

## 2023-10-03 ENCOUNTER — TELEPHONE (OUTPATIENT)
Dept: FAMILY MEDICINE CLINIC | Facility: CLINIC | Age: 53
End: 2023-10-03
Payer: COMMERCIAL

## 2023-10-03 NOTE — TELEPHONE ENCOUNTER
Veozah 45MG tablets DENIED       Additional Information Required  The requested medication is not covered under the [employer] formulary. These non-covered drugs have available covered options in the same therapeutic class which are listed along with information on the formulary exception process for [employer] online: <[email or web address]> The full approved drug list is available at: <[email or web address]>  Drug  Veozah 45MG tablets

## 2023-10-03 NOTE — TELEPHONE ENCOUNTER
"Left voicemail for patient to call back.  I had called regarding the Veozah prescription that was recently prescribed. The PA was denied stating \"The requested medication is not covered under the [employer] formulary.\" Pt will need to call her insurance to see what is covered.   "

## 2023-10-04 LAB — BACTERIA SPEC AEROBE CULT: ABNORMAL

## 2023-10-09 ENCOUNTER — E-VISIT (OUTPATIENT)
Dept: ADMINISTRATIVE | Facility: OTHER | Age: 53
End: 2023-10-09
Payer: COMMERCIAL

## 2023-10-09 ENCOUNTER — TELEMEDICINE (OUTPATIENT)
Dept: FAMILY MEDICINE CLINIC | Facility: TELEHEALTH | Age: 53
End: 2023-10-09
Payer: COMMERCIAL

## 2023-10-09 DIAGNOSIS — J45.901 MILD ASTHMA WITH ACUTE EXACERBATION, UNSPECIFIED WHETHER PERSISTENT: Primary | ICD-10-CM

## 2023-10-09 RX ORDER — PREDNISONE 10 MG/1
TABLET ORAL DAILY
Qty: 21 EACH | Refills: 0 | Status: SHIPPED | OUTPATIENT
Start: 2023-10-09 | End: 2023-10-15

## 2023-10-09 NOTE — PROGRESS NOTES
You have chosen to receive care through a telehealth visit.  Do you consent to use a video/audio connection for your medical care today? Yes     HPI  Beba Ambrose is a 53 y.o. female  presents with complaint of experiencing an Asthma exacerbation due to the weather changing. She reports mild shortness of breath and wheezing. She has been using Albuterol and Advair. She reports this is common with the weather changes during this time of year. She reports the Albuterol and Advair is helping but she needs steroids to get over the hump.     Review of Systems   Respiratory:  Positive for cough, chest tightness, shortness of breath and wheezing.    Cardiovascular: Negative.    Allergic/Immunologic: Positive for environmental allergies.   Hematological: Negative.        Past Medical History:   Diagnosis Date    Abnormal Pap smear of cervix     Acute cystitis 07/15/2016    ADHD (attention deficit hyperactivity disorder) 12/01/2020    Allergic     Had my whole life used to get shots    Allergies     Anxiety     Asthma     inhalers as needed    Broken bones     Depression 12/01/2020    Diabetes mellitus, type 2 01/08/2021    Essential hypertension 12/01/2020    Glucose found in urine on examination 12/01/2020    HISTORY OF GLUCOSE IN HER URINE, WE WILL CHECK AN A1C TODAY AND WE WILL SEND A URINALYSIS FOR MICROSCOPY.    HPV (human papilloma virus) infection     Migraine headache        Family History   Problem Relation Age of Onset    Parkinsonism Father     Other Father         Parkinson's    Cancer Other     Alcohol abuse Mother     COPD Mother     Depression Mother     Learning disabilities Brother     Ovarian cancer Neg Hx     Uterine cancer Neg Hx     Breast cancer Neg Hx     Melanoma Neg Hx     Colon cancer Neg Hx     Prostate cancer Neg Hx     Clotting disorder Neg Hx        Social History     Socioeconomic History    Marital status: Single   Tobacco Use    Smoking status: Former     Packs/day: 0.50     Years: 15.00      Additional pack years: 0.00     Total pack years: 7.50     Types: Cigarettes     Start date: 3/18/2005     Quit date: 3/1/2020     Years since quitting: 3.6    Smokeless tobacco: Never    Tobacco comments:     STARTED SMOKING AT AGE 2; SMOKED 10 CIGS A DAY; SOME SECOND HAND SMOKE EXPOSURE   Vaping Use    Vaping Use: Never used   Substance and Sexual Activity    Alcohol use: Yes     Comment: Rarely drink    Drug use: Never    Sexual activity: Not Currently     Partners: Male     Birth control/protection: Post-menopausal, None     Comment: No partner currently         There were no vitals taken for this visit.    PHYSICAL EXAM  Physical Exam   Constitutional: She is oriented to person, place, and time. She appears well-developed and well-nourished. She does not have a sickly appearance. She does not appear ill. No distress.   HENT:   Head: Normocephalic and atraumatic.   Pulmonary/Chest: Effort normal.  No respiratory distress. She no audible wheeze...  Neurological: She is oriented to person, place, and time.   Psychiatric: She has a normal mood and affect.   Vitals reviewed.      Diagnoses and all orders for this visit:    1. Mild asthma with acute exacerbation, unspecified whether persistent (Primary)  -     predniSONE (DELTASONE) 10 MG (21) dose pack; Take  by mouth Daily for 6 days.  Dispense: 21 each; Refill: 0    For worsening s/s go to the [facility] ED.   Take steroids with food in morning after breakfast.       FOLLOW-UP  As discussed during visit with [facility], if symptoms worsen or fail to improve, follow-up with PCP/Urgent Care/Emergency Department.    Patient verbalizes understanding of medications, instructions for treatment and follow-up.    AJ Martinez  10/09/2023  10:35 EDT    The use of a video visit has been reviewed with the patient and verbal informed consent has been obtained. Myself and Beba Ambrose participated in this visit. The patient is located in  [facility], and I am located  in [place], [place]. MyChart and Twill[employer] were utilized.

## 2023-10-09 NOTE — E-VISIT ESCALATED
Chief Complaint: Cold, flu, COVID, sinus, hay fever, or seasonal allergies   Patient was shown the following escalation message:   Some conditions need a visit with a healthcare provider as soon as possible   Using albuterol more than once every 3 hours is concerning. Make an appointment to be seen in the next 24 hours.   ----------   Patient Interview Transcript:   Which of these symptoms are bothering you? Select all that apply.    Shortness of breath    Runny nose    Itchy or watery eyes   Not selected:    Cough    Stuffed-up nose or sinuses    Itchy nose or sneezing    Loss of smell or taste    Sore throat    Hoarse voice or loss of voice    Headache    Fever    Sweats    Chills    Muscle or body aches    Fatigue or tiredness    Nausea or vomiting    Diarrhea    I don't have any of these symptoms   When did your current symptoms start? Select one.    Less than 48 hours ago   Not selected:    3 to 5 days ago    6 to 9 days ago    10 to 14 days ago    2 to 3 weeks ago    3 to 4 weeks ago    More than a month ago   Do you know the exact date your symptoms started? If so, enter the date as MM/DD/YY. Select one.    Yes (specify): 10/7/23   Not selected:    No   Did your symptoms come on suddenly or gradually? Select one.    Gradually   Not selected:    Suddenly    I'm not sure   Since your current symptoms started, have you had a viral test for COVID-19? - This includes home self-tests as well as nose swab or saliva tests done at a doctor's office, lab, or testing site. - It does NOT include antibody tests, which use a blood sample to test for past infection. Select one.    No   Not selected:    Yes   Taking a home COVID test can help your provider give you the best care. If you have a COVID test kit, take the test now before continuing with this interview. Do you have a COVID test kit? Select one.    Yes, but I prefer not to take a test now   Not selected:    Yes, and I'll take a test now    No, I don't have a  "test kit   Have you gotten the COVID-19 vaccine? Select one.    Yes   Not selected:    No   How many total doses of the COVID-19 vaccine have you gotten? This includes boosters as well as additional doses for those who are immunocompromised. Select one.    3 doses   Not selected:    1 dose    2 doses    4 doses    5 doses   1st dose    Pfizer   Not selected:    J&J/Luci    Moderna    Novavax   2nd dose    Pfizer   Not selected:    J&J/Luci    Moderna    Novavax   3rd dose    Pfizer   Not selected:    J&J/Luic    Moderna    Novavax   When did you get your most recent dose of the COVID-19 vaccine?    More than 14 days ago   Not selected:    Less than 48 hours (2 days) ago    48 to 72 hours (3 days) ago    3 to 5 days ago    5 to 7 days ago    7 to 14 days ago   In the last 14 days, have you traveled outside of your local community? This includes travel by car, RV, bus, train, or plane. Travel increases your chances of getting and spreading COVID-19. Select one.    No   Not selected:    Yes   In the last 14 days, have you had close contact with someone who has coronavirus (COVID-19)? \"Close contact\" means any of these: - Living in the same household as someone with COVID-19. - Caring for someone with COVID-19. - Being within 6 feet of someone with COVID-19 for a total of at least 15 minutes over a 24-hour period. For example, three 5-minute exposures for a total of 15 minutes. - Being in direct contact with respiratory droplets from someone with COVID-19 (being coughed on, kissing, sharing utensils). Select one.    No, not that I know of   Not selected:    Yes, a confirmed case    Yes, a suspected case   How would you describe your shortness of breath? Sometimes shortness of breath can be a sign of a more serious condition. Select one.    Moderate (it's bad, but I can still do simple things like get dressed, bathe, or comb my hair)   Not selected:    Mild (about what I normally have with a cold)    Severe " "(it's so bad that I can't do simple things like get dressed, bathe, or comb my hair)   Do you have a home pulse oximeter? A pulse oximeter measures the oxygen level of your blood. It's usually a clip-like device that attaches to your finger or your ear. Most pulse oximeters can also measure your heart rate. Select one.    No   Not selected:    Yes   We'd like to find out more about your shortness of breath. Try to say the following sentence out loud: \"I went to the store today to buy bread, milk, and eggs.\" Are you able to get to the end of the sentence without stopping for breath? If not, you may need emergency care.    Yes   Not selected:    No   Are you able to take a full, deep breath? A full, deep breath means inhaling for 3 to 4 seconds. If you can't do this, you may need to seek emergency care. Select one.    Yes   Not selected:    No   Do you feel sinus pain or pressure in any of these areas?    Around my eyes   Not selected:    In my forehead    Behind my nose    In my cheeks    In my upper teeth or jaw    No   When did you first notice your sinus pain or pressure? Select one.    Less than 5 days ago   Not selected:    5 to 9 days ago    10 to 14 days ago    2 to 4 weeks ago    1 month ago or longer   Does coughing, sneezing, or leaning forward make your sinuses feel worse? Select one.    No   Not selected:    Yes   What color is your nasal drainage? Select one.    Clear   Not selected:    White    Yellow or yellowish    Green or greenish    My nose is stuffed but not draining or running   Is your nasal drainage thick or thin? Select one.    Thin   Not selected:    Thick   Is there any drainage (mucus) going down the back of your throat? This kind of drainage is also called \"postnasal drip.\" Select one.    No, not that I know of   Not selected:    Yes   Since your symptoms started, have you felt dizzy? Select one.    No   Not selected:    Yes, but I can continue with my regular daily activities    Yes, and " it makes it hard to stand, walk, or do daily activities   Do you have chest pain? You might also feel it as discomfort, aching, tightness, or squeezing in the chest. Select one.    No   Not selected:    Yes   Have you urinated at least 3 times in the last 24 hours? Select one.    Yes   Not selected:    No   Changes in alertness or awareness may mean you need emergency care. Since your symptoms started, have you had any of these? Select all that apply.    None of the above   Not selected:    Confusion    Slurred speech    Not knowing where you are or what day it is    Difficulty staying conscious    Fainting or passing out   Do your symptoms include a whistling sound, or wheezing, when you breathe? Select one.    Yes   Not selected:    No    I'm not sure   Are your eyelids or the areas around your eyes puffy? Select one.    Yes, but I can easily open my eye(s)   Not selected:    Yes, and it's hard to open my eye(s)    Yes, and my eye(s) are completely swollen shut    No   Do you have any of these symptoms in your ear(s)? Select all that apply.    None of the above   Not selected:    Pain    Pressure    Fullness    Crackling or popping    Plugged or blocked sensation   Can you move your chin toward your chest?    Yes   Not selected:    No, my neck is too stiff   Are your glands/lymph nodes swollen, or does it hurt when you touch them?    No, not that I can tell   Not selected:    Yes   In the past week, has anyone around you (such as at school, work, or home) had a confirmed diagnosis of the flu? A confirmed diagnosis means that a nose swab was done to verify a flu infection. Select all that apply.    No, not that I know of   Not selected:    I live with someone who has the flu    I've been within touching distance of someone who has the flu    I've walked by, or sat about 3 feet away from, someone who has the flu    I've been in the same building as someone who has the flu   Have you ever been diagnosed with asthma?  Select one.    Yes   Not selected:    No   Are your cold symptoms making your asthma worse? Select one.    Yes   Not selected:    No   Are you currently using any medications or inhalers for your asthma? Select one.    Yes   Not selected:    No    I'm supposed to, but I ran out   What medications or inhalers are you currently using for your asthma? Select all that apply.    Albuterol inhaler, as needed (such as ProAir, Proventil, Ventolin)   Not selected:    Inhaled steroid (such as Asmanex, Qvar, Pulmicort, Flovent)    Inhaled steroid with long-acting bronchodilator (such as Advair, Dulera, Symbicort)    Leukotriene modifier (such as Singulair)    Oral steroids (such as prednisone)    Other (specify)   How often are you using albuterol? If you're using albuterol very frequently, you'll need to be seen in person. Select one.    Every 1 to 2 hours   Not selected:    More than once an hour    Every 2 to 3 hours    Every 3 to 4 hours    Every 4 to 6 hours    Every 6 hours or longer   ----------   Medical history   Medical history data does not currently exist for this patient.

## 2023-10-19 DIAGNOSIS — J45.901 MILD ASTHMA WITH ACUTE EXACERBATION, UNSPECIFIED WHETHER PERSISTENT: ICD-10-CM

## 2023-10-19 DIAGNOSIS — N30.01 ACUTE CYSTITIS WITH HEMATURIA: Primary | ICD-10-CM

## 2023-10-19 RX ORDER — SULFAMETHOXAZOLE AND TRIMETHOPRIM 800; 160 MG/1; MG/1
1 TABLET ORAL 2 TIMES DAILY
Qty: 14 TABLET | Refills: 0 | Status: SHIPPED | OUTPATIENT
Start: 2023-10-19 | End: 2023-10-26

## 2023-10-19 RX ORDER — BUDESONIDE AND FORMOTEROL FUMARATE DIHYDRATE 80; 4.5 UG/1; UG/1
2 AEROSOL RESPIRATORY (INHALATION) 3 TIMES DAILY PRN
Qty: 10.2 G | Refills: 1 | Status: SHIPPED | OUTPATIENT
Start: 2023-10-19 | End: 2023-10-20 | Stop reason: SDUPTHER

## 2023-10-20 DIAGNOSIS — J45.901 MILD ASTHMA WITH ACUTE EXACERBATION, UNSPECIFIED WHETHER PERSISTENT: ICD-10-CM

## 2023-10-20 RX ORDER — BUDESONIDE AND FORMOTEROL FUMARATE DIHYDRATE 80; 4.5 UG/1; UG/1
2 AEROSOL RESPIRATORY (INHALATION) 3 TIMES DAILY PRN
Qty: 10.2 G | Refills: 1 | Status: SHIPPED | OUTPATIENT
Start: 2023-10-20 | End: 2023-10-20 | Stop reason: SDUPTHER

## 2023-10-20 RX ORDER — BUDESONIDE AND FORMOTEROL FUMARATE DIHYDRATE 80; 4.5 UG/1; UG/1
2 AEROSOL RESPIRATORY (INHALATION) 3 TIMES DAILY PRN
Qty: 10.2 G | Refills: 1 | Status: SHIPPED | OUTPATIENT
Start: 2023-10-20 | End: 2023-10-23 | Stop reason: SDUPTHER

## 2023-10-23 ENCOUNTER — PRIOR AUTHORIZATION (OUTPATIENT)
Dept: FAMILY MEDICINE CLINIC | Facility: CLINIC | Age: 53
End: 2023-10-23
Payer: COMMERCIAL

## 2023-10-23 ENCOUNTER — TELEPHONE (OUTPATIENT)
Dept: FAMILY MEDICINE CLINIC | Facility: CLINIC | Age: 53
End: 2023-10-23
Payer: COMMERCIAL

## 2023-10-23 DIAGNOSIS — J45.901 MILD ASTHMA WITH ACUTE EXACERBATION, UNSPECIFIED WHETHER PERSISTENT: ICD-10-CM

## 2023-10-23 RX ORDER — ALBUTEROL SULFATE 1.25 MG/3ML
1 SOLUTION RESPIRATORY (INHALATION) EVERY 6 HOURS PRN
Qty: 60 EACH | Refills: 12 | Status: SHIPPED | OUTPATIENT
Start: 2023-10-23

## 2023-10-23 RX ORDER — BUDESONIDE AND FORMOTEROL FUMARATE DIHYDRATE 80; 4.5 UG/1; UG/1
2 AEROSOL RESPIRATORY (INHALATION)
Qty: 10.2 G | Refills: 1 | Status: SHIPPED | OUTPATIENT
Start: 2023-10-23 | End: 2023-10-24

## 2023-10-23 NOTE — TELEPHONE ENCOUNTER
Symbicort 80-4.5MCG/ACT aerosol PA APPROVAL     Key: [id number] help? Call us at [phone]  Outcome  Additional Information Required  Your PA has been resolved, no additional PA is required. For further inquiries please contact the number on the back of the member prescription card. (Message [phone])

## 2023-10-23 NOTE — TELEPHONE ENCOUNTER
"Patient was prescribed Symbicort inhaler. The instructions say to take 2 puffs TID PRN. Pharmacy is requesting clarification as they say this is not typically prescribed like this. Please clarify.    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

## 2023-10-24 ENCOUNTER — OFFICE VISIT (OUTPATIENT)
Dept: FAMILY MEDICINE CLINIC | Facility: CLINIC | Age: 53
End: 2023-10-24
Payer: COMMERCIAL

## 2023-10-24 VITALS
DIASTOLIC BLOOD PRESSURE: 80 MMHG | OXYGEN SATURATION: 98 % | WEIGHT: 138 LBS | SYSTOLIC BLOOD PRESSURE: 122 MMHG | HEART RATE: 115 BPM | HEIGHT: 63 IN | BODY MASS INDEX: 24.45 KG/M2 | TEMPERATURE: 98.2 F

## 2023-10-24 DIAGNOSIS — J45.901 MODERATE ASTHMA WITH EXACERBATION, UNSPECIFIED WHETHER PERSISTENT: Primary | ICD-10-CM

## 2023-10-24 RX ORDER — METHYLPREDNISOLONE SODIUM SUCCINATE 125 MG/2ML
125 INJECTION, POWDER, LYOPHILIZED, FOR SOLUTION INTRAMUSCULAR; INTRAVENOUS ONCE
Status: COMPLETED | OUTPATIENT
Start: 2023-10-24 | End: 2023-10-24

## 2023-10-24 RX ORDER — IPRATROPIUM BROMIDE AND ALBUTEROL SULFATE 2.5; .5 MG/3ML; MG/3ML
3 SOLUTION RESPIRATORY (INHALATION) EVERY 4 HOURS PRN
Qty: 360 ML | Refills: 1 | Status: SHIPPED | OUTPATIENT
Start: 2023-10-24 | End: 2023-11-23

## 2023-10-24 RX ORDER — FLUTICASONE FUROATE, UMECLIDINIUM BROMIDE AND VILANTEROL TRIFENATATE 200; 62.5; 25 UG/1; UG/1; UG/1
1 POWDER RESPIRATORY (INHALATION) DAILY
Qty: 60 EACH | Refills: 6 | Status: SHIPPED | OUTPATIENT
Start: 2023-10-24 | End: 2023-11-23

## 2023-10-24 RX ORDER — METHYLPREDNISOLONE 4 MG/1
TABLET ORAL
Qty: 21 TABLET | Refills: 1 | Status: SHIPPED | OUTPATIENT
Start: 2023-10-24

## 2023-10-24 RX ADMIN — METHYLPREDNISOLONE SODIUM SUCCINATE 125 MG: 125 INJECTION, POWDER, LYOPHILIZED, FOR SOLUTION INTRAMUSCULAR; INTRAVENOUS at 14:07

## 2023-10-24 NOTE — PROGRESS NOTES
"Chief Complaint  Chief Complaint   Patient presents with    Asthma     \"I need prednisone\"       HPI:  Beba Ambrose presents to [facility]    Beba Ambrose is a 53-year-old female who presents today for an office visit.     She has been having difficulty with her asthma. She reports having wheezing which was relieved by laying down and worsened by activity. Her symptoms began on Thursday 10/19/2023. She attributes her symptoms to weather change. She usually takes prednisone pack when her flare ups worsen. She has had asthma all her life. She cannot obtain her Symbicort due to an insurance reimbursement issue. She has not had an asthma attack in a long time. She did have a syncopal episode due to her recent asthma flare-up. She does not take Advair despite it being prescribed. However, she has been taking Trelegy. She believes it helps but she is also going through menopause. Her excessive sweating may be attributed to that as well. She has not had a lung function test in several years. She denies having drainage. She does take Singulair. Her blood glucose is elevated secondary to her steroid use.     She will be off from work for two days.     She is originally from [place]. She moved to [place] because her  is in the . She is a teacher by profession.   Procedures     Past History:  Medical History: has a past medical history of Abnormal Pap smear of cervix, Acute cystitis (07/15/2016), ADHD (attention deficit hyperactivity disorder) (12/01/2020), Allergic, Allergies, Anxiety, Asthma, Broken bones, Depression (12/01/2020), Diabetes mellitus, type 2 (01/08/2021), Essential hypertension (12/01/2020), Glucose found in urine on examination (12/01/2020), HPV (human papilloma virus) infection, and Migraine headache.   Surgical History: has a past surgical history that includes Breast Augmentation; Vaginal Biopsy; LEEP (N/A, 09/12/2022); and Breast surgery (2009).   Family " History: family history includes Alcohol abuse in her mother; COPD in her mother; Cancer in an other family member; Depression in her mother; Learning disabilities in her brother; Other in her father; Parkinsonism in her father.   Social History: reports that she quit smoking about 3 years ago. Her smoking use included cigarettes. She started smoking about 18 years ago. She has a 7.50 pack-year smoking history. She has never used smokeless tobacco. She reports current alcohol use. She reports that she does not use drugs.  Immunization History   Administered Date(s) Administered    COVID-19 (PFIZER) Purple Cap Monovalent 07/21/2021, 08/11/2021    Covid-19 (Pfizer) Gray Cap Monovalent 01/25/2022         Allergies: Metformin     Medications:  Current Outpatient Medications on File Prior to Visit   Medication Sig Dispense Refill    albuterol (ACCUNEB) 1.25 MG/3ML nebulizer solution Take 3 mL by nebulization Every 6 (Six) Hours As Needed for Wheezing or Shortness of Air. 60 each 12    albuterol sulfate  (90 Base) MCG/ACT inhaler Inhale 2 puffs Every 4 Hours As Needed for Wheezing. 18 g 2    Dulaglutide (Trulicity) 1.5 MG/0.5ML solution pen-injector Inject 1.5 mg under the skin into the appropriate area as directed 1 (One) Time Per Week. 2 mL 3    famotidine (Pepcid) 20 MG tablet Take 1 tablet by mouth 2 (Two) Times a Day. 60 tablet 0    fexofenadine (Allergy Relief) 180 MG tablet Take 1 tablet by mouth Daily. 30 tablet 1    ibuprofen (ADVIL,MOTRIN) 600 MG tablet Take 1 tablet by mouth Every 6 (Six) Hours As Needed for Mild Pain. 40 tablet 0    lisinopril (PRINIVIL,ZESTRIL) 10 MG tablet Take 2 tablets by mouth Daily. 30 tablet 5    montelukast (Singulair) 10 MG tablet Take 1 tablet by mouth Every Night. 90 tablet 1    sertraline (ZOLOFT) 100 MG tablet Take 1 tablet by mouth Daily. 90 tablet 1    sertraline (Zoloft) 50 MG tablet Take 1 tablet by mouth Daily. 90 tablet 1    Fezolinetant (VEOZAH) 45 MG tablet Take 1  "tablet by mouth Daily. (Patient not taking: Reported on 10/24/2023) 30 tablet 5     No current facility-administered medications on file prior to visit.        Health Maintenance Due   Topic Date Due    Hepatitis B (1 of 3 - 3-dose series) Never done    MAMMOGRAM  Never done    DIABETIC EYE EXAM  Never done    COVID-19 Vaccine (4 - 2023-24 season) 09/01/2023    DIABETIC FOOT EXAM  09/07/2023       Vital Signs:   Vitals:    10/24/23 1023   BP: 122/80   Pulse: 115   Temp: 98.2 °F (36.8 °C)   SpO2: 98%   Weight: 62.6 kg (138 lb)   Height: 160 cm (63\")      Body mass index is 24.45 kg/m².     ROS:  Review of Systems   Constitutional:  Negative for fatigue and fever.   HENT:  Negative for congestion, ear pain and sinus pressure.    Respiratory:  Positive for cough and wheezing. Negative for chest tightness.    Cardiovascular:  Negative for chest pain, palpitations and leg swelling.   Gastrointestinal:  Negative for abdominal pain and diarrhea.   Genitourinary:  Negative for dysuria and frequency.   Neurological:  Negative for speech difficulty, headache and confusion.   Psychiatric/Behavioral:  Negative for agitation and behavioral problems.           Physical Exam  Vitals reviewed.   Constitutional:       Appearance: Normal appearance.   HENT:      Right Ear: Tympanic membrane normal.      Left Ear: Tympanic membrane normal.      Nose: Nose normal.   Eyes:      Extraocular Movements: Extraocular movements intact.      Conjunctiva/sclera: Conjunctivae normal.      Pupils: Pupils are equal, round, and reactive to light.   Cardiovascular:      Rate and Rhythm: Normal rate and regular rhythm.   Pulmonary:      Effort: Pulmonary effort is normal.      Breath sounds: Wheezing present.   Abdominal:      General: Bowel sounds are normal.   Musculoskeletal:         General: Normal range of motion.      Cervical back: Normal range of motion.   Skin:     General: Skin is warm and dry.   Neurological:      General: No focal deficit " present.      Mental Status: She is alert and oriented to person, place, and time.   Psychiatric:         Mood and Affect: Mood normal.         Behavior: Behavior normal.          Result Review        Diagnoses and all orders for this visit:    1. Moderate asthma with exacerbation, unspecified whether persistent (Primary)  -     methylPREDNISolone sodium succinate (SOLU-Medrol) injection 125 mg  -     methylPREDNISolone (MEDROL) 4 MG dose pack; Take as directed on package instructions.  Dispense: 21 tablet; Refill: 1  -     ipratropium-albuterol (DUO-NEB) 0.5-2.5 mg/3 ml nebulizer; Take 3 mL by nebulization Every 4 (Four) Hours As Needed for Wheezing for up to 30 days.  Dispense: 360 mL; Refill: 1  -     Fluticasone-Umeclidin-Vilant (Trelegy Ellipta) 200-62.5-25 MCG/ACT aerosol powder ; Inhale 1 puff Daily for 30 days.  Dispense: 60 each; Refill: 6          Smoking Cessation:    Beba Ambrose  reports that she quit smoking about 3 years ago. Her smoking use included cigarettes. She started smoking about 18 years ago. She has a 7.50 pack-year smoking history. She has never used smokeless tobacco.    Asthma exacerbation  - I am going to order a steroid injection, prednisone dose pack, and a pulmonary function test.     Follow Up   Return in about 4 weeks (around 11/21/2023), or if symptoms worsen or fail to improve.  Patient was given instructions and counseling regarding her condition or for health maintenance advice. Please see specific information pulled into the AVS if appropriate.       Alva Saunders MD  Answers submitted by the patient for this visit:  Other (Submitted on 10/24/2023)  Please describe your symptoms.: Asthma  Have you had these symptoms before?: Yes  How long have you been having these symptoms?: 5-7 days  Please list any medications you are currently taking for this condition.: Nebulizer- the meds prescribed by my primary last Thursday [facility]  would[facility] until something was cleared up with primary so they were waiting on the fax called [facility] office yesterday and left message Sunday- no one will give me what i need which is prednisone and now my rescue inhaler is empty my pharmacy is waiting on a delivery of it possibly today ans the inhaler prescribed last week to [facility] may be ready after 230 today  Please describe any probable cause for these symptoms. : The change un weather and temperature always triggers my asthma so i havent been able to completely get over thos last trigger i know it will be triggered again this week because of the rain and by next Tues we will have gone from high 70’s all week to 52 projected temp for next Tuesday- i will be in the hospital without prednisone-  Primary Reason for Visit (Submitted on 10/24/2023)  What is the primary reason for your visit?: Other    Transcribed from ambient dictation for Alva Saunders MD by Sakshi Reyes.  10/24/23   12:17 EDT    Patient or patient representative verbalized consent to the visit recording.  I have personally performed the services described in this document as transcribed by the above individual, and it is both accurate and complete.

## 2023-11-07 ENCOUNTER — CLINICAL SUPPORT (OUTPATIENT)
Dept: FAMILY MEDICINE CLINIC | Facility: CLINIC | Age: 53
End: 2023-11-07
Payer: COMMERCIAL

## 2023-11-07 DIAGNOSIS — Z23 NEED FOR INFLUENZA VACCINATION: Primary | ICD-10-CM

## 2023-11-07 DIAGNOSIS — R30.0 DYSURIA: ICD-10-CM

## 2023-11-07 LAB
BILIRUB BLD-MCNC: NEGATIVE MG/DL
CLARITY, POC: CLEAR
COLOR UR: YELLOW
GLUCOSE UR STRIP-MCNC: ABNORMAL MG/DL
KETONES UR QL: NEGATIVE
LEUKOCYTE EST, POC: NEGATIVE
NITRITE UR-MCNC: NEGATIVE MG/ML
PH UR: 6 [PH] (ref 5–8)
PROT UR STRIP-MCNC: NEGATIVE MG/DL
RBC # UR STRIP: NEGATIVE /UL
SP GR UR: 1.03 (ref 1–1.03)
UROBILINOGEN UR QL: NORMAL

## 2023-12-20 DIAGNOSIS — E11.65 TYPE 2 DIABETES MELLITUS WITH HYPERGLYCEMIA, WITHOUT LONG-TERM CURRENT USE OF INSULIN: ICD-10-CM

## 2023-12-21 DIAGNOSIS — N30.00 ACUTE CYSTITIS WITHOUT HEMATURIA: Primary | ICD-10-CM

## 2023-12-21 DIAGNOSIS — Z91.89 AT RISK FOR IMPAIRED FUNCTION OF LIVER: ICD-10-CM

## 2023-12-21 RX ORDER — NITROFURANTOIN 25; 75 MG/1; MG/1
100 CAPSULE ORAL 2 TIMES DAILY
Qty: 10 CAPSULE | Refills: 0 | Status: SHIPPED | OUTPATIENT
Start: 2023-12-21 | End: 2023-12-26

## 2023-12-21 RX ORDER — DULAGLUTIDE 1.5 MG/.5ML
1.5 INJECTION, SOLUTION SUBCUTANEOUS WEEKLY
Qty: 2 ML | Refills: 3 | Status: SHIPPED | OUTPATIENT
Start: 2023-12-21

## 2024-01-12 RX ORDER — ALBUTEROL SULFATE 90 UG/1
AEROSOL, METERED RESPIRATORY (INHALATION)
Qty: 18 G | Refills: 2 | Status: SHIPPED | OUTPATIENT
Start: 2024-01-12

## 2024-01-24 RX ORDER — LISINOPRIL 10 MG/1
20 TABLET ORAL DAILY
Qty: 30 TABLET | Refills: 5 | Status: SHIPPED | OUTPATIENT
Start: 2024-01-24

## 2024-01-30 DIAGNOSIS — J45.901 MODERATE ASTHMA WITH EXACERBATION, UNSPECIFIED WHETHER PERSISTENT: ICD-10-CM

## 2024-01-30 RX ORDER — METHYLPREDNISOLONE 4 MG/1
TABLET ORAL
Qty: 21 TABLET | Refills: 1 | Status: SHIPPED | OUTPATIENT
Start: 2024-01-30

## 2024-02-06 DIAGNOSIS — J45.901 MODERATE ASTHMA WITH EXACERBATION, UNSPECIFIED WHETHER PERSISTENT: Primary | ICD-10-CM

## 2024-02-07 ENCOUNTER — OFFICE VISIT (OUTPATIENT)
Dept: FAMILY MEDICINE CLINIC | Facility: CLINIC | Age: 54
End: 2024-02-07
Payer: COMMERCIAL

## 2024-02-07 VITALS
WEIGHT: 141 LBS | TEMPERATURE: 98 F | HEART RATE: 104 BPM | OXYGEN SATURATION: 98 % | DIASTOLIC BLOOD PRESSURE: 90 MMHG | SYSTOLIC BLOOD PRESSURE: 140 MMHG | BODY MASS INDEX: 24.98 KG/M2 | HEIGHT: 63 IN

## 2024-02-07 DIAGNOSIS — J45.901 MILD ASTHMA WITH ACUTE EXACERBATION, UNSPECIFIED WHETHER PERSISTENT: Primary | ICD-10-CM

## 2024-02-07 PROCEDURE — 99214 OFFICE O/P EST MOD 30 MIN: CPT | Performed by: STUDENT IN AN ORGANIZED HEALTH CARE EDUCATION/TRAINING PROGRAM

## 2024-02-07 RX ORDER — AZITHROMYCIN 250 MG/1
TABLET, FILM COATED ORAL
Qty: 6 TABLET | Refills: 0 | Status: SHIPPED | OUTPATIENT
Start: 2024-02-07

## 2024-02-07 RX ORDER — PREDNISONE 50 MG/1
50 TABLET ORAL DAILY
Qty: 7 TABLET | Refills: 0 | Status: SHIPPED | OUTPATIENT
Start: 2024-02-07 | End: 2024-02-14

## 2024-02-07 NOTE — PROGRESS NOTES
"Chief Complaint  Wheezing    Subjective      History of Present Illness  Beba Ambrose is a 53 y.o. female who presents to [facility] with a past medical history of asthma presents today for ongoing wheezing.  She denies any productive cough fever runny nose or congestion.  She states she has a history of asthma and when the weather gets cold she has exacerbations.  She was treated with Medrol pack last week and states that it helped but she is having worsening symptoms today.  Past Medical History:   Diagnosis Date    Abnormal Pap smear of cervix     Acute cystitis 07/15/2016    ADHD (attention deficit hyperactivity disorder) 12/01/2020    Allergic     Had my whole life used to get shots    Allergies     Anxiety     Asthma     inhalers as needed    Broken bones     Depression 12/01/2020    Diabetes mellitus, type 2 01/08/2021    Essential hypertension 12/01/2020    Glucose found in urine on examination 12/01/2020    HISTORY OF GLUCOSE IN HER URINE, WE WILL CHECK AN A1C TODAY AND WE WILL SEND A URINALYSIS FOR MICROSCOPY.    HPV (human papilloma virus) infection     Migraine headache          Objective   Vital Signs:   Vitals:    02/07/24 0934   BP: 140/90   Pulse: 104   Temp: 98 °F (36.7 °C)   SpO2: 98%   Weight: 64 kg (141 lb)   Height: 160 cm (63\")     Body mass index is 24.98 kg/m².    Wt Readings from Last 3 Encounters:   02/07/24 64 kg (141 lb)   10/24/23 62.6 kg (138 lb)   10/02/23 64.7 kg (142 lb 9.6 oz)     BP Readings from Last 3 Encounters:   02/07/24 140/90   10/24/23 122/80   10/02/23 144/84       Health Maintenance   Topic Date Due    Hepatitis B (1 of 3 - 3-dose series) Never done    MAMMOGRAM  Never done    DIABETIC EYE EXAM  Never done    DIABETIC FOOT EXAM  09/07/2023    ZOSTER VACCINE (1 of 2) 02/07/2024 (Originally 2/14/2021)    INFLUENZA VACCINE  03/31/2024 (Originally 8/1/2023)    Pneumococcal Vaccine 0-64 (1 of 2 - PCV) 04/17/2024 (Originally [birth year]14[birth year]1977)    " TDAP/TD VACCINES (1 - Tdap) 04/17/2024 (Originally 2/14/1990)    COVID-19 Vaccine (4 - 2023-24 season) 04/28/2024 (Originally 9/1/2023)    URINE MICROALBUMIN  03/01/2024    HEMOGLOBIN A1C  04/02/2024    COLORECTAL CANCER SCREENING  04/15/2024    ANNUAL PHYSICAL  10/02/2024    LIPID PANEL  10/02/2024    PAP SMEAR  03/28/2026    HEPATITIS C SCREENING  Completed       Physical Exam  Constitutional:       Appearance: Normal appearance.   HENT:      Head: Normocephalic.      Nose: Nose normal.      Mouth/Throat:      Mouth: Mucous membranes are moist.   Eyes:      Pupils: Pupils are equal, round, and reactive to light.   Cardiovascular:      Rate and Rhythm: Normal rate and regular rhythm.   Pulmonary:      Effort: Pulmonary effort is normal. No tachypnea, accessory muscle usage, prolonged expiration or respiratory distress.      Breath sounds: Examination of the right-upper field reveals wheezing. Examination of the left-upper field reveals wheezing. Examination of the right-middle field reveals wheezing. Examination of the left-middle field reveals wheezing. Examination of the right-lower field reveals wheezing. Examination of the left-lower field reveals wheezing. Decreased breath sounds present.   Abdominal:      General: Abdomen is flat.   Musculoskeletal:         General: Normal range of motion.      Cervical back: Normal range of motion.   Skin:     General: Skin is warm and dry.   Neurological:      General: No focal deficit present.      Mental Status: She is alert.   Psychiatric:         Mood and Affect: Mood normal.         Thought Content: Thought content normal.            Result Review :  The following data was reviewed by: Sharlene Miller MD on 02/07/2024:      Procedures        Assessment and Plan   Diagnoses and all orders for this visit:    1. Mild asthma with acute exacerbation, unspecified whether persistent (Primary)  -     predniSONE (DELTASONE) 50 MG tablet; Take 1 tablet by mouth Daily for 7 days.   Dispense: 7 tablet; Refill: 0  -     azithromycin (Zithromax Z-Murphy) 250 MG tablet; Take 2 tablets by mouth on day 1, then 1 tablet daily on days 2-5  Dispense: 6 tablet; Refill: 0      Will treat for asthma exacerbation follow-up with PCP in 5 to 10 days if not improved.  BMI is within normal parameters. No other follow-up for BMI required.         FOLLOW UP  No follow-ups on file.  Patient was given instructions and counseling regarding her condition or for health maintenance advice. Please see specific information pulled into the AVS if appropriate.       Sharlene Miller MD  02/07/24  10:09 EST    CURRENT & DISCONTINUED MEDICATIONS  Current Outpatient Medications   Medication Instructions    albuterol (ACCUNEB) 1.25 mg, Nebulization, Every 6 Hours PRN    albuterol sulfate  (90 Base) MCG/ACT inhaler Inhale 2 puffs Every 4 Hours As Needed for Wheezing.    azithromycin (Zithromax Z-Murphy) 250 MG tablet Take 2 tablets by mouth on day 1, then 1 tablet daily on days 2-5    fexofenadine (ALLERGY RELIEF) 180 mg, Oral, Daily    ibuprofen (ADVIL,MOTRIN) 600 mg, Oral, Every 6 Hours PRN    ipratropium-albuterol (DUO-NEB) 0.5-2.5 mg/3 ml nebulizer 3 mL, Nebulization, Every 4 Hours PRN    lisinopril (PRINIVIL,ZESTRIL) 20 mg, Oral, Daily    montelukast (SINGULAIR) 10 mg, Oral, Nightly    predniSONE (DELTASONE) 50 mg, Oral, Daily    sertraline (ZOLOFT) 100 mg, Oral, Daily    sertraline (ZOLOFT) 50 mg, Oral, Daily    Trulicity 1.5 mg, Subcutaneous, Weekly       Medications Discontinued During This Encounter   Medication Reason    methylPREDNISolone (MEDROL) 4 MG dose pack     Fezolinetant (VEOZAH) 45 MG tablet     famotidine (Pepcid) 20 MG tablet

## 2024-02-21 ENCOUNTER — APPOINTMENT (OUTPATIENT)
Dept: GENERAL RADIOLOGY | Facility: HOSPITAL | Age: 54
End: 2024-02-21
Payer: COMMERCIAL

## 2024-02-21 ENCOUNTER — HOSPITAL ENCOUNTER (EMERGENCY)
Facility: HOSPITAL | Age: 54
Discharge: HOME | End: 2024-02-21
Attending: EMERGENCY MEDICINE | Admitting: EMERGENCY MEDICINE
Payer: COMMERCIAL

## 2024-02-21 ENCOUNTER — OFFICE VISIT (OUTPATIENT)
Dept: FAMILY MEDICINE CLINIC | Facility: CLINIC | Age: 54
End: 2024-02-21
Payer: COMMERCIAL

## 2024-02-21 VITALS
SYSTOLIC BLOOD PRESSURE: 141 MMHG | BODY MASS INDEX: 26.17 KG/M2 | OXYGEN SATURATION: 98 % | DIASTOLIC BLOOD PRESSURE: 89 MMHG | RESPIRATION RATE: 16 BRPM | WEIGHT: 147.71 LBS | HEART RATE: 107 BPM | TEMPERATURE: 98.4 F | HEIGHT: 63 IN

## 2024-02-21 DIAGNOSIS — J45.51 SEVERE PERSISTENT ASTHMA WITH ACUTE EXACERBATION: Primary | ICD-10-CM

## 2024-02-21 DIAGNOSIS — J45.901 ASTHMA WITH ACUTE EXACERBATION, UNSPECIFIED ASTHMA SEVERITY, UNSPECIFIED WHETHER PERSISTENT: Primary | ICD-10-CM

## 2024-02-21 DIAGNOSIS — J96.01 ACUTE RESPIRATORY FAILURE WITH HYPOXIA: ICD-10-CM

## 2024-02-21 LAB
ALBUMIN SERPL-MCNC: 4.2 G/DL (ref 3.5–5.2)
ALBUMIN/GLOB SERPL: 1.7 G/DL
ALP SERPL-CCNC: 81 U/L (ref 39–117)
ALT SERPL W P-5'-P-CCNC: 31 U/L (ref 1–33)
ANION GAP SERPL CALCULATED.3IONS-SCNC: 12.1 MMOL/L (ref 5–15)
AST SERPL-CCNC: 32 U/L (ref 1–32)
BASOPHILS # BLD AUTO: 0.06 10*3/MM3 (ref 0–0.2)
BASOPHILS NFR BLD AUTO: 0.7 % (ref 0–1.5)
BILIRUB SERPL-MCNC: 0.3 MG/DL (ref 0–1.2)
BUN SERPL-MCNC: 15 MG/DL (ref 6–20)
BUN/CREAT SERPL: 22.7 (ref 7–25)
CALCIUM SPEC-SCNC: 9.1 MG/DL (ref 8.6–10.5)
CHLORIDE SERPL-SCNC: 105 MMOL/L (ref 98–107)
CO2 SERPL-SCNC: 22.9 MMOL/L (ref 22–29)
CREAT SERPL-MCNC: 0.66 MG/DL (ref 0.57–1)
DEPRECATED RDW RBC AUTO: 45.2 FL (ref 37–54)
EGFRCR SERPLBLD CKD-EPI 2021: 105 ML/MIN/1.73
EOSINOPHIL # BLD AUTO: 0.75 10*3/MM3 (ref 0–0.4)
EOSINOPHIL NFR BLD AUTO: 8.5 % (ref 0.3–6.2)
ERYTHROCYTE [DISTWIDTH] IN BLOOD BY AUTOMATED COUNT: 13.2 % (ref 12.3–15.4)
GLOBULIN UR ELPH-MCNC: 2.5 GM/DL
GLUCOSE SERPL-MCNC: 224 MG/DL (ref 65–99)
HCT VFR BLD AUTO: 43.1 % (ref 34–46.6)
HGB BLD-MCNC: 13.6 G/DL (ref 12–15.9)
HOLD SPECIMEN: NORMAL
HOLD SPECIMEN: NORMAL
IMM GRANULOCYTES # BLD AUTO: 0.04 10*3/MM3 (ref 0–0.05)
IMM GRANULOCYTES NFR BLD AUTO: 0.5 % (ref 0–0.5)
LYMPHOCYTES # BLD AUTO: 2.14 10*3/MM3 (ref 0.7–3.1)
LYMPHOCYTES NFR BLD AUTO: 24.4 % (ref 19.6–45.3)
MCH RBC QN AUTO: 29.5 PG (ref 26.6–33)
MCHC RBC AUTO-ENTMCNC: 31.6 G/DL (ref 31.5–35.7)
MCV RBC AUTO: 93.5 FL (ref 79–97)
MONOCYTES # BLD AUTO: 0.55 10*3/MM3 (ref 0.1–0.9)
MONOCYTES NFR BLD AUTO: 6.3 % (ref 5–12)
NEUTROPHILS NFR BLD AUTO: 5.24 10*3/MM3 (ref 1.7–7)
NEUTROPHILS NFR BLD AUTO: 59.6 % (ref 42.7–76)
NRBC BLD AUTO-RTO: 0 /100 WBC (ref 0–0.2)
NT-PROBNP SERPL-MCNC: 58.9 PG/ML (ref 0–900)
PLATELET # BLD AUTO: 400 10*3/MM3 (ref 140–450)
PMV BLD AUTO: 9.1 FL (ref 6–12)
POTASSIUM SERPL-SCNC: 4.3 MMOL/L (ref 3.5–5.2)
PROT SERPL-MCNC: 6.7 G/DL (ref 6–8.5)
QT INTERVAL: 336 MS
QTC INTERVAL: 461 MS
RBC # BLD AUTO: 4.61 10*6/MM3 (ref 3.77–5.28)
SODIUM SERPL-SCNC: 140 MMOL/L (ref 136–145)
TROPONIN T SERPL HS-MCNC: 8 NG/L
WBC NRBC COR # BLD AUTO: 8.78 10*3/MM3 (ref 3.4–10.8)
WHOLE BLOOD HOLD COAG: NORMAL
WHOLE BLOOD HOLD SPECIMEN: NORMAL

## 2024-02-21 PROCEDURE — 83880 ASSAY OF NATRIURETIC PEPTIDE: CPT | Performed by: EMERGENCY MEDICINE

## 2024-02-21 PROCEDURE — 96374 THER/PROPH/DIAG INJ IV PUSH: CPT

## 2024-02-21 PROCEDURE — 94761 N-INVAS EAR/PLS OXIMETRY MLT: CPT

## 2024-02-21 PROCEDURE — 84484 ASSAY OF TROPONIN QUANT: CPT | Performed by: EMERGENCY MEDICINE

## 2024-02-21 PROCEDURE — 99284 EMERGENCY DEPT VISIT MOD MDM: CPT

## 2024-02-21 PROCEDURE — 80053 COMPREHEN METABOLIC PANEL: CPT | Performed by: EMERGENCY MEDICINE

## 2024-02-21 PROCEDURE — 94640 AIRWAY INHALATION TREATMENT: CPT

## 2024-02-21 PROCEDURE — 85025 COMPLETE CBC W/AUTO DIFF WBC: CPT | Performed by: EMERGENCY MEDICINE

## 2024-02-21 PROCEDURE — 93010 ELECTROCARDIOGRAM REPORT: CPT | Performed by: SPECIALIST

## 2024-02-21 PROCEDURE — 36415 COLL VENOUS BLD VENIPUNCTURE: CPT

## 2024-02-21 PROCEDURE — 71045 X-RAY EXAM CHEST 1 VIEW: CPT

## 2024-02-21 PROCEDURE — 93005 ELECTROCARDIOGRAM TRACING: CPT | Performed by: EMERGENCY MEDICINE

## 2024-02-21 PROCEDURE — 25010000002 METHYLPREDNISOLONE PER 125 MG: Performed by: EMERGENCY MEDICINE

## 2024-02-21 PROCEDURE — 94799 UNLISTED PULMONARY SVC/PX: CPT

## 2024-02-21 RX ORDER — METHYLPREDNISOLONE SODIUM SUCCINATE 125 MG/2ML
125 INJECTION, POWDER, LYOPHILIZED, FOR SOLUTION INTRAMUSCULAR; INTRAVENOUS ONCE
Status: DISCONTINUED | OUTPATIENT
Start: 2024-02-21 | End: 2024-02-21

## 2024-02-21 RX ORDER — METHYLPREDNISOLONE SODIUM SUCCINATE 125 MG/2ML
125 INJECTION, POWDER, LYOPHILIZED, FOR SOLUTION INTRAMUSCULAR; INTRAVENOUS ONCE
Status: COMPLETED | OUTPATIENT
Start: 2024-02-21 | End: 2024-02-21

## 2024-02-21 RX ORDER — SODIUM CHLORIDE 0.9 % (FLUSH) 0.9 %
10 SYRINGE (ML) INJECTION AS NEEDED
Status: DISCONTINUED | OUTPATIENT
Start: 2024-02-21 | End: 2024-02-21 | Stop reason: HOSPADM

## 2024-02-21 RX ORDER — IPRATROPIUM BROMIDE AND ALBUTEROL SULFATE 2.5; .5 MG/3ML; MG/3ML
3 SOLUTION RESPIRATORY (INHALATION) ONCE
Status: COMPLETED | OUTPATIENT
Start: 2024-02-21 | End: 2024-02-21

## 2024-02-21 RX ORDER — PREDNISONE 20 MG/1
60 TABLET ORAL DAILY
Qty: 15 TABLET | Refills: 0 | Status: SHIPPED | OUTPATIENT
Start: 2024-02-21

## 2024-02-21 RX ADMIN — METHYLPREDNISOLONE SODIUM SUCCINATE 125 MG: 125 INJECTION INTRAMUSCULAR; INTRAVENOUS at 12:15

## 2024-02-21 RX ADMIN — METHYLPREDNISOLONE SODIUM SUCCINATE 125 MG: 125 INJECTION, POWDER, LYOPHILIZED, FOR SOLUTION INTRAMUSCULAR; INTRAVENOUS at 09:13

## 2024-02-21 RX ADMIN — IPRATROPIUM BROMIDE AND ALBUTEROL SULFATE 3 ML: 2.5; .5 SOLUTION RESPIRATORY (INHALATION) at 09:16

## 2024-02-21 RX ADMIN — IPRATROPIUM BROMIDE AND ALBUTEROL SULFATE 3 ML: .5; 3 SOLUTION RESPIRATORY (INHALATION) at 12:11

## 2024-02-21 NOTE — Clinical Note
[facility] EMERGENCY ROOM  [address]  [place] [address]  Phone: [phone]    Beba Ambrose was seen and treated in our emergency department on 2/21/2024.  She may return to work on 02/22/2024.         Thank you for choosing [facility].    Hossein Kim MD

## 2024-02-21 NOTE — ED PROVIDER NOTES
Time: 11:50 AM EST  Date of encounter:  2/21/2024  Independent Historian/Clinical History and Information was obtained by:   Patient    History is limited by: N/A    Chief Complaint: shortness of breath, asthma      History of Present Illness:  Patient is a 54 y.o. year old female who presents to the emergency department for evaluation of shortness of breath. Patient has long history of asthma which she states is has been worsening over the last week. She attributes her exacerbation to changes in weather. Patient states she received a steroid shot and duo-neb treatment in clinic this morning. She has also used albuterol at home multiple times before arriving. She denies chest pain, fever, or chills.     HPI    Patient Care Team  Primary Care Provider: Phuong Garcia APRN    Past Medical History:     Allergies   Allergen Reactions    Metformin Nausea Only     Past Medical History:   Diagnosis Date    Abnormal Pap smear of cervix     Acute cystitis 07/15/2016    ADHD (attention deficit hyperactivity disorder) 12/01/2020    Allergic     Had my whole life used to get shots    Allergies     Anxiety     Asthma     inhalers as needed    Broken bones     Depression 12/01/2020    Diabetes mellitus, type 2 01/08/2021    Essential hypertension 12/01/2020    Glucose found in urine on examination 12/01/2020    HISTORY OF GLUCOSE IN HER URINE, WE WILL CHECK AN A1C TODAY AND WE WILL SEND A URINALYSIS FOR MICROSCOPY.    HPV (human papilloma virus) infection     Migraine headache      Past Surgical History:   Procedure Laterality Date    BREAST AUGMENTATION      BREAST SURGERY  2009    LEEP N/A 09/12/2022    Procedure: LOOP ELECTROCAUTERY EXCISION PROCEDURE;  Surgeon: Esme Lambert MD;  Location: [facility] OR [facility];  Service: Gynecology;  Laterality: N/A;    VAGINAL BIOPSY       Family History   Problem Relation Age of Onset    Parkinsonism Father     Other Father         Parkinson’s    Cancer Other     Alcohol abuse Mother      COPD Mother     Depression Mother     Learning disabilities Brother     Ovarian cancer Neg Hx     Uterine cancer Neg Hx     Breast cancer Neg Hx     Melanoma Neg Hx     Colon cancer Neg Hx     Prostate cancer Neg Hx     Clotting disorder Neg Hx        Home Medications:  Prior to Admission medications    Medication Sig Start Date End Date Taking? Authorizing Provider   albuterol (ACCUNEB) 1.25 MG/3ML nebulizer solution Take 3 mL by nebulization Every 6 (Six) Hours As Needed for Wheezing or Shortness of Air. 10/23/23   Phuong Garcia APRN   albuterol sulfate  (90 Base) MCG/ACT inhaler Inhale 2 puffs Every 4 Hours As Needed for Wheezing. 1/12/24   Diallo Pozo APRN   azithromycin (Zithromax Z-Murphy) 250 MG tablet Take 2 tablets by mouth on day 1, then 1 tablet daily on days 2-5 2/7/24   Sharlene Miller MD   fexofenadine (Allergy Relief) 180 MG tablet Take 1 tablet by mouth Daily. 8/25/23   Phuong Garcia APRN   ibuprofen (ADVIL,MOTRIN) 600 MG tablet Take 1 tablet by mouth Every 6 (Six) Hours As Needed for Mild Pain. 9/12/22   Esme Lambert MD   ipratropium-albuterol (DUO-NEB) 0.5-2.5 mg/3 ml nebulizer Take 3 mL by nebulization Every 4 (Four) Hours As Needed for Wheezing for up to 30 days. 10/24/23 11/23/23  Alva Saunders MD   lisinopril (PRINIVIL,ZESTRIL) 10 MG tablet TAKE TWO TABLETS BY MOUTH EVERY DAY 1/24/24   Phuong Garcia APRN   montelukast (Singulair) 10 MG tablet Take 1 tablet by mouth Every Night. 2/16/23   Diallo Pozo APRN   sertraline (ZOLOFT) 100 MG tablet Take 1 tablet by mouth Daily. 8/25/23   Phuong Garcia APRN   sertraline (Zoloft) 50 MG tablet Take 1 tablet by mouth Daily. 8/25/23   Phuong Garcia APRN   Trulicity 1.5 MG/0.5ML solution pen-injector Inject 1.5 mg under the skin into the appropriate area as directed 1 (One) Time Per Week. 12/21/23   Phuong Garcia APRN        Social History:   Social History  "    Tobacco Use    Smoking status: Former     Packs/day: 0.50     Years: 15.00     Additional pack years: 0.00     Total pack years: 7.50     Types: Cigarettes     Start date: 3/18/2005     Quit date: 3/1/2020     Years since quitting: 3.9    Smokeless tobacco: Never    Tobacco comments:     STARTED SMOKING AT AGE 2; SMOKED 10 CIGS A DAY; SOME SECOND HAND SMOKE EXPOSURE   Vaping Use    Vaping Use: Never used   Substance Use Topics    Alcohol use: Yes     Comment: Rarely drink    Drug use: Never         Review of Systems:  Review of Systems   Constitutional:  Negative for chills and fever.   Respiratory:  Positive for cough, chest tightness, shortness of breath and wheezing.    Cardiovascular:  Negative for chest pain.        Physical Exam:  /89   Pulse 107   Temp 98.4 °F (36.9 °C) (Oral)   Resp 16   Ht 160 cm (63\")   Wt 67 kg (147 lb 11.3 oz)   LMP  (LMP Unknown)   SpO2 98%   BMI 26.17 kg/m²     Physical Exam  Vitals and nursing note reviewed.   Constitutional:       General: She is not in acute distress.     Appearance: Normal appearance. She is well-developed. She is not toxic-appearing.   HENT:      Head: Normocephalic and atraumatic.      Jaw: There is normal jaw occlusion.   Eyes:      General: Lids are normal.      Extraocular Movements: Extraocular movements intact.      Conjunctiva/sclera: Conjunctivae normal.      Pupils: Pupils are equal, round, and reactive to light.   Cardiovascular:      Rate and Rhythm: Regular rhythm. Tachycardia present.      Pulses: Normal pulses.      Heart sounds: Normal heart sounds. No murmur heard.     No gallop.   Pulmonary:      Effort: Pulmonary effort is normal. No respiratory distress.      Breath sounds: Normal breath sounds. Wheezing present. No rhonchi.   Abdominal:      General: Abdomen is flat.      Palpations: Abdomen is soft.      Tenderness: There is no abdominal tenderness. There is no guarding or rebound.   Musculoskeletal:         General: Normal " "range of motion.      Cervical back: Normal range of motion and neck supple.      Right lower leg: No edema.      Left lower leg: No edema.   Skin:     General: Skin is warm and dry.   Neurological:      Mental Status: She is alert and oriented to person, place, and time. Mental status is at baseline.   Psychiatric:         Mood and Affect: Mood normal.         Behavior: Behavior normal.                  Procedures:  Procedures      Medical Decision Making:      Comorbidities that affect care:    Former smoker, Asthma, Diabetes, Hypertension, Hyperlipidemia, Depression, Anxiety    External Notes reviewed:    Previous Clinic Note: Family medicine visit with Dr. Alva Saunders on 02/18/2024 for \"asthma flare up again\"      The following orders were placed and all results were independently analyzed by me:  Orders Placed This Encounter   Procedures    XR Chest 1 View    [place] Draw    Comprehensive Metabolic Panel    BNP    Single High Sensitivity Troponin T    CBC Auto Differential    NPO Diet NPO Type: Strict NPO    Undress & Gown    Continuous Pulse Oximetry    Vital Signs    Oxygen Therapy- Nasal Cannula; Titrate 1-6 LPM Per SpO2; 90 - 95%    ECG 12 Lead ED Triage Standing Order; SOA    Insert Peripheral IV    CBC & Differential    Green Top (Gel)    Lavender Top    Gold Top - SST    Light Blue Top       Medications Given in the Emergency Department:  Medications   sodium chloride 0.9 % flush 10 mL (has no administration in time range)   ipratropium-albuterol (DUO-NEB) nebulizer solution 3 mL (3 mL Nebulization Given 2/21/24 1211)   methylPREDNISolone sodium succinate (SOLU-Medrol) injection 125 mg (125 mg Intravenous Given 2/21/24 1215)        ED Course:         Labs:    Lab Results (last 24 hours)       Procedure Component Value Units Date/Time    CBC & Differential [[id number]]  (Abnormal) Collected: 02/21/24 0941    Specimen: Blood Updated: 02/21/24 0949    Narrative:      The following orders were created for " panel order CBC & Differential.  Procedure                               Abnormality         Status                     ---------                               -----------         ------                     CBC Auto Differential[[id number]]        Abnormal            Final result                 Please view results for these tests on the individual orders.    Comprehensive Metabolic Panel [[id number]]  (Abnormal) Collected: 02/21/24 0941    Specimen: Blood Updated: 02/21/24 1016     Glucose 224 mg/dL      BUN 15 mg/dL      Creatinine 0.66 mg/dL      Sodium 140 mmol/L      Potassium 4.3 mmol/L      Comment: Slight hemolysis detected by analyzer. Result may be falsely elevated.        Chloride 105 mmol/L      CO2 22.9 mmol/L      Calcium 9.1 mg/dL      Total Protein 6.7 g/dL      Albumin 4.2 g/dL      ALT (SGPT) 31 U/L      AST (SGOT) 32 U/L      Comment: Slight hemolysis detected by analyzer. Result may be falsely elevated.        Alkaline Phosphatase 81 U/L      Total Bilirubin 0.3 mg/dL      Globulin 2.5 gm/dL      A/G Ratio 1.7 g/dL      BUN/Creatinine Ratio 22.7     Anion Gap 12.1 mmol/L      eGFR 105.0 mL/min/1.73     Narrative:      GFR Normal >60  Chronic Kidney Disease <60  Kidney Failure <15      BNP [[id number]]  (Normal) Collected: 02/21/24 0941    Specimen: Blood Updated: 02/21/24 1009     proBNP 58.9 pg/mL     Narrative:      This assay is used as an aid in the diagnosis of individuals suspected of having heart failure. It can be used as an aid in the diagnosis of acute decompensated heart failure (ADHF) in patients presenting with signs and symptoms of ADHF to the emergency department (ED). In addition, NT-proBNP of <300 pg/mL indicates ADHF is not likely.    Age Range Result Interpretation  NT-proBNP Concentration (pg/mL:      <50             Positive            >450                   Gray                 300-450                    Negative             <300    50-75           Positive            >900                   [place]                300-900                  Negative            <300      >75             Positive            >1800                  Gray                300-1800                  Negative            <300    Single High Sensitivity Troponin T [[id number]]  (Normal) Collected: 02/21/24 0941    Specimen: Blood Updated: 02/21/24 1010     HS Troponin T 8 ng/L     Narrative:      High Sensitive Troponin T Reference Range:  <14.0 ng/L- Negative Female for AMI  <22.0 ng/L- Negative Male for AMI  >=14 - Abnormal Female indicating possible myocardial injury.  >=22 - Abnormal Male indicating possible myocardial injury.   Clinicians would have to utilize clinical acumen, EKG, Troponin, and serial changes to determine if it is an Acute Myocardial Infarction or myocardial injury due to an underlying chronic condition.         CBC Auto Differential [[id number]]  (Abnormal) Collected: 02/21/24 0941    Specimen: Blood Updated: 02/21/24 0949     WBC 8.78 10*3/mm3      RBC 4.61 10*6/mm3      Hemoglobin 13.6 g/dL      Hematocrit 43.1 %      MCV 93.5 fL      MCH 29.5 pg      MCHC 31.6 g/dL      RDW 13.2 %      RDW-SD 45.2 fl      MPV 9.1 fL      Platelets 400 10*3/mm3      Neutrophil % 59.6 %      Lymphocyte % 24.4 %      Monocyte % 6.3 %      Eosinophil % 8.5 %      Basophil % 0.7 %      Immature Grans % 0.5 %      Neutrophils, Absolute 5.24 10*3/mm3      Lymphocytes, Absolute 2.14 10*3/mm3      Monocytes, Absolute 0.55 10*3/mm3      Eosinophils, Absolute 0.75 10*3/mm3      Basophils, Absolute 0.06 10*3/mm3      Immature Grans, Absolute 0.04 10*3/mm3      nRBC 0.0 /100 WBC              Imaging:    XR Chest 1 View    Result Date: 2/21/2024  PROCEDURE: XR CHEST 1 VW  COMPARISON: [facility], CR, CHEST PA/AP & LAT 2V, 9/12/2017, 11:11.  INDICATIONS: SOA  FINDINGS:  The lungs are clear bilaterally.  The cardiac and mediastinal silhouettes appear normal.  No effusion is seen.        1. No acute cardiopulmonary disease       Yuri  KAI Tobin       Electronically Signed and Approved By: Yuri Tobin M.D. on 2/21/2024 at 10:22                Differential Diagnosis and Discussion:    Dyspnea: Differential diagnosis includes but is not limited to metabolic acidosis, neurological disorders, psychogenic, asthma, pneumothorax, upper airway obstruction, COPD, pneumonia, noncardiogenic pulmonary edema, interstitial lung disease, anemia, congestive heart failure, and pulmonary embolism    All labs were reviewed and interpreted by me.  All X-rays impressions were independently interpreted by me.    MDM  Number of Diagnoses or Management Options  Asthma with acute exacerbation, unspecified asthma severity, unspecified whether persistent  Diagnosis management comments: In summary this is a 54-year-old female who presents to the emergency department for evaluation of shortness of breath and wheezing.  She does have a history of asthma.  CBC independently reviewed by me and shows no critical abnormalities.  CMP independently reviewed by me and shows no critical abnormalities.  Chest x-ray unremarkable.  Patient has received breathing treatment and steroids in the emergency department with improvement of symptoms.  She be discharged home with a prescription for oral steroids.  Very strict return to ER and follow-up instructions have been provided to the patient.                   Patient Care Considerations:    CT CHEST: I considered ordering a CT scan of the chest, however no hypoxia or significant respiratory distress      Consultants/Shared Management Plan:    None    Social Determinants of Health:    Patient is independent, reliable, and has access to care.       Disposition and Care Coordination:    Discharged: I considered escalation of care by admitting this patient to the hospital, however the patient has improved and is suitable and  stable for discharge.    I have explained the patient´s condition, diagnoses and treatment plan based on the  information available to me at this time. I have answered questions and addressed any concerns. The patient has a good  understanding of the patient´s diagnosis, condition, and treatment plan as can be expected at this point. The vital signs have been stable. The patient´s condition is stable and appropriate for discharge from the emergency department.      The patient will pursue further outpatient evaluation with the primary care physician or other designated or consulting physician as outlined in the discharge instructions. They are agreeable to this plan of care and follow-up instructions have been explained in detail. The patient has received these instructions in written format and has expressed an understanding of the discharge instructions. The patient is aware that any significant change in condition or worsening of symptoms should prompt an immediate return to this or the closest emergency department or call to 911.  I have explained discharge medications and the need for follow up with the patient/caretakers. This was also printed in the discharge instructions. Patient was discharged with the following medications and follow up:      Medication List        New Prescriptions      predniSONE 20 MG tablet  Commonly known as: DELTASONE  Take 3 tablets by mouth Daily.               Where to Get Your Medications        These medications were sent to [id number] - [place], [place] - [address] - [phone]  - [phone]  [address] [facility] [address]      Phone: [phone]   predniSONE 20 MG tablet      Phuong Garcia, APRN  [address]  [facility] [address] [phone]    In 1 week         Final diagnoses:   Asthma with acute exacerbation, unspecified asthma severity, unspecified whether persistent        ED Disposition       ED Disposition   Discharge    Condition   Stable    Comment   --               This medical record created using  voice recognition software.            Hossein Kim MD  02/21/24 8030

## 2024-02-21 NOTE — PROGRESS NOTES
Chief Complaint  No chief complaint on file.    Subjective          Beba Ambrose presents to [facility]  History of Present Illness 54-year-old female who is a patient of the list [place] presents today for an acute visit shortness of breath and wheezing.  While in the waiting room patient had increased work of breathing and was unable to speak she was brought back to room 4.  When I saw the patient she was unable to answer question her O2 sat was 52 her heart rate was 134 she appeared to be in respiratory distress.  Dr. Whiting and I examined the patient, she was tachypneic unable to answer questions.  She was given DuoNeb as well as 125 Solu-Medrol ambulance was called right away.  By the time ambulance showed up her O2 sat was already 98% on 4 L of oxygen, she was able to answer questions at that time.  Her /significant other was also in the room and stated that on the way to the doctor this morning she did stop breathing.  She has been wheezing for 5 days according to him.      Current Outpatient Medications   Medication Instructions    albuterol (ACCUNEB) 1.25 mg, Nebulization, Every 6 Hours PRN    albuterol sulfate  (90 Base) MCG/ACT inhaler Inhale 2 puffs Every 4 Hours As Needed for Wheezing.    azithromycin (Zithromax Z-Murphy) 250 MG tablet Take 2 tablets by mouth on day 1, then 1 tablet daily on days 2-5    fexofenadine (ALLERGY RELIEF) 180 mg, Oral, Daily    ibuprofen (ADVIL,MOTRIN) 600 mg, Oral, Every 6 Hours PRN    ipratropium-albuterol (DUO-NEB) 0.5-2.5 mg/3 ml nebulizer 3 mL, Nebulization, Every 4 Hours PRN    lisinopril (PRINIVIL,ZESTRIL) 20 mg, Oral, Daily    montelukast (SINGULAIR) 10 mg, Oral, Nightly    sertraline (ZOLOFT) 100 mg, Oral, Daily    sertraline (ZOLOFT) 50 mg, Oral, Daily    Trulicity 1.5 mg, Subcutaneous, Weekly       The following portions of the patient's history were reviewed and updated as appropriate: allergies, current medications,  past family history, past medical history, past social history, past surgical history, and problem list.    Objective   Vital Signs:   There were no vitals taken for this visit.    BP Readings from Last 3 Encounters:   02/07/24 140/90   10/24/23 122/80   10/02/23 144/84     Wt Readings from Last 3 Encounters:   02/07/24 64 kg (141 lb)   10/24/23 62.6 kg (138 lb)   10/02/23 64.7 kg (142 lb 9.6 oz)     BMI is within normal parameters. No other follow-up for BMI required.     Physical Exam   General:  AAO x3, peers to be in acute distress patient was leaning on the counter unable to take deep breaths.  Eyes:  EOMI, PERRLA  Ears/Nose/Mouth/Throat:  Moist mucous membranes  Respiratory: deCreased breath sounds throughout, increased accessory muscle usage was noted tachypnea cardiovascular:    Cardiac murmur rubs or gallops  Gastrointestinal:  Abdomen soft nondistended nontender bowel sounds present x4 quadrants  Musculoskeletal: Hands appear to be cold and clammy moves all 4 extremities spontaneously, no apparent weakness  Skin:  Warm, dry, no skin rashes or lesions  Neurologic:  AAO x3, cranial nerves 2-12 grossly intact, no focal neuro deficits  Psychiatric:  Normal mood and affect    [unfilled]    Result Review :   The following data was reviewed by: Sharlene Miller MD on 02/21/2024:  Common labs          3/1/2023    13:18 10/2/2023    08:09   Common Labs   Glucose 149  115    BUN 23  12    Creatinine 1.30  0.66    Sodium 141  144    Potassium 4.4  4.2    Chloride 104  108    Calcium 9.4  9.2    Albumin 4.5  4.5    Total Bilirubin <0.2  0.2    Alkaline Phosphatase 88  105    AST (SGOT) 17  36    ALT (SGPT) 17  34    WBC 13.37  7.45    Hemoglobin 13.4  13.8    Hematocrit 39.9  40.9    Platelets 457  466    Total Cholesterol 200  182    Triglycerides 71  83    HDL Cholesterol 96  82    LDL Cholesterol  91  85    Hemoglobin A1C 6.30  6.30    Microalbumin, Urine <1.2              Lab Results   Component Value Date     BILIRUBINUR Negative 11/07/2023       Procedures        Assessment and Plan    Diagnoses and all orders for this visit:    1. Severe persistent asthma with acute exacerbation (Primary)  -     Discontinue: methylPREDNISolone sodium succinate (SOLU-Medrol) injection 125 mg  -     ipratropium-albuterol (DUO-NEB) nebulizer solution 3 mL  -     methylPREDNISolone sodium succinate (SOLU-Medrol) injection 125 mg    2. Acute respiratory failure with hypoxia  -     Discontinue: methylPREDNISolone sodium succinate (SOLU-Medrol) injection 125 mg  -     ipratropium-albuterol (DUO-NEB) nebulizer solution 3 mL  -     methylPREDNISolone sodium succinate (SOLU-Medrol) injection 125 mg    Patient was sent to the ER for further care and management.      Medications Discontinued During This Encounter   Medication Reason    methylPREDNISolone sodium succinate (SOLU-Medrol) injection 125 mg         I spent 21 minutes caring for Beba on this date of service. This time includes time spent by me in the following activities: Treating the patient, reviewing labs, reviewing old notes  Follow Up   No follow-ups on file.  Patient was given instructions and counseling regarding her condition or for health maintenance advice. Please see specific information pulled into the AVS if appropriate.       Sharlene Miller MD  02/21/24  09:31 EST

## 2024-02-23 ENCOUNTER — TELEPHONE (OUTPATIENT)
Dept: FAMILY MEDICINE CLINIC | Facility: CLINIC | Age: 54
End: 2024-02-23
Payer: COMMERCIAL

## 2024-02-23 DIAGNOSIS — F41.9 ANXIETY: ICD-10-CM

## 2024-02-23 DIAGNOSIS — F33.1 MODERATE EPISODE OF RECURRENT MAJOR DEPRESSIVE DISORDER: ICD-10-CM

## 2024-02-23 RX ORDER — SERTRALINE HYDROCHLORIDE 100 MG/1
100 TABLET, FILM COATED ORAL DAILY
Qty: 90 TABLET | Refills: 1 | Status: SHIPPED | OUTPATIENT
Start: 2024-02-23

## 2024-02-23 NOTE — TELEPHONE ENCOUNTER
Called and left a message on patient cell phone voice mail and called home phone which had no voice mail.

## 2024-04-18 ENCOUNTER — TELEPHONE (OUTPATIENT)
Dept: OBSTETRICS AND GYNECOLOGY | Facility: CLINIC | Age: 54
End: 2024-04-18

## 2024-06-08 DIAGNOSIS — E11.65 TYPE 2 DIABETES MELLITUS WITH HYPERGLYCEMIA, WITHOUT LONG-TERM CURRENT USE OF INSULIN: ICD-10-CM

## 2024-06-10 RX ORDER — ALBUTEROL SULFATE 90 UG/1
2 AEROSOL, METERED RESPIRATORY (INHALATION) EVERY 4 HOURS PRN
Qty: 18 G | Refills: 2 | Status: SHIPPED | OUTPATIENT
Start: 2024-06-10

## 2024-06-12 RX ORDER — DULAGLUTIDE 1.5 MG/.5ML
INJECTION, SOLUTION SUBCUTANEOUS
Qty: 2 ML | Refills: 3 | Status: SHIPPED | OUTPATIENT
Start: 2024-06-12

## 2024-06-13 ENCOUNTER — TELEPHONE (OUTPATIENT)
Dept: FAMILY MEDICINE CLINIC | Facility: CLINIC | Age: 54
End: 2024-06-13
Payer: COMMERCIAL

## 2024-06-13 NOTE — TELEPHONE ENCOUNTER
Caller:  ROMANA LARA     Relationship:  SELF     Best call back number:  [phone]     Who are you requesting to speak with (clinical staff, provider,  specific staff member):  CLINICAL     What was the call regarding:   Patient returned call to Henrry. Please call back at above number

## 2024-06-19 ENCOUNTER — OFFICE VISIT (OUTPATIENT)
Dept: FAMILY MEDICINE CLINIC | Facility: CLINIC | Age: 54
End: 2024-06-19
Payer: COMMERCIAL

## 2024-06-19 VITALS
WEIGHT: 134.2 LBS | SYSTOLIC BLOOD PRESSURE: 118 MMHG | DIASTOLIC BLOOD PRESSURE: 76 MMHG | OXYGEN SATURATION: 96 % | TEMPERATURE: 98 F | BODY MASS INDEX: 23.78 KG/M2 | HEIGHT: 63 IN | HEART RATE: 96 BPM | RESPIRATION RATE: 16 BRPM

## 2024-06-19 DIAGNOSIS — E11.65 TYPE 2 DIABETES MELLITUS WITH HYPERGLYCEMIA, WITHOUT LONG-TERM CURRENT USE OF INSULIN: ICD-10-CM

## 2024-06-19 DIAGNOSIS — I10 ESSENTIAL HYPERTENSION: Primary | ICD-10-CM

## 2024-06-19 DIAGNOSIS — J45.30 MILD PERSISTENT ASTHMA WITHOUT COMPLICATION: ICD-10-CM

## 2024-06-19 LAB
ALBUMIN UR-MCNC: 1.4 MG/DL
CREAT UR-MCNC: 131.9 MG/DL
HBA1C MFR BLD: 6.2 % (ref 4.8–5.6)
MICROALBUMIN/CREAT UR: 10.6 MG/G (ref 0–29)

## 2024-06-19 PROCEDURE — 82043 UR ALBUMIN QUANTITATIVE: CPT

## 2024-06-19 PROCEDURE — 82570 ASSAY OF URINE CREATININE: CPT

## 2024-06-19 PROCEDURE — 83036 HEMOGLOBIN GLYCOSYLATED A1C: CPT

## 2024-06-19 PROCEDURE — 99214 OFFICE O/P EST MOD 30 MIN: CPT

## 2024-06-19 RX ORDER — FLUTICASONE FUROATE, UMECLIDINIUM BROMIDE AND VILANTEROL TRIFENATATE 200; 62.5; 25 UG/1; UG/1; UG/1
POWDER RESPIRATORY (INHALATION)
COMMUNITY
Start: 2024-06-12

## 2024-06-19 RX ORDER — ALBUTEROL SULFATE AND BUDESONIDE 90; 80 UG/1; UG/1
2 AEROSOL, METERED RESPIRATORY (INHALATION)
Qty: 10.7 G | Refills: 0 | COMMUNITY
Start: 2024-06-19

## 2024-06-19 RX ORDER — ALBUTEROL SULFATE AND BUDESONIDE 90; 80 UG/1; UG/1
AEROSOL, METERED RESPIRATORY (INHALATION)
Status: CANCELLED | OUTPATIENT
Start: 2024-06-19

## 2024-06-19 RX ORDER — ALBUTEROL SULFATE AND BUDESONIDE 90; 80 UG/1; UG/1
2 AEROSOL, METERED RESPIRATORY (INHALATION)
Qty: 32.1 G | Refills: 1 | Status: SHIPPED | OUTPATIENT
Start: 2024-06-19

## 2024-06-19 RX ORDER — METHYLPREDNISOLONE 4 MG/1
TABLET ORAL
Qty: 21 TABLET | Refills: 0 | Status: SHIPPED | OUTPATIENT
Start: 2024-06-19

## 2024-06-19 NOTE — PROGRESS NOTES
Chief Complaint  Chief Complaint   Patient presents with    Follow-up    Med Refill       Subjective      Beba Ambrose presents to [facility]  History of Present Illness  The patient presents for follow-up on hypertension, type 2 diabetes, asthma..    The patient's asthma has been exacerbated by the heat and seasonal changes, leading to frequent flare-ups this year. She is currently on a daily regimen of Trelegy, an albuterol nebulizer, and an albuterol inhaler as needed.  She feels that regular use of the Trelegy inhaler has been beneficial for her.    Blood pressure is well-controlled on current medication regimen.  Blood pressure is appropriate today at 118/76.    Most recent hemoglobin A1c was 6.3%.  She is on Trulicity 1.5 mg weekly.  She states that she has not had any difficulty obtaining the Trulicity 1.5 mg dose.      Objective     Medical History:  Past Medical History:   Diagnosis Date    Abnormal Pap smear of cervix     Acute cystitis 07/15/2016    ADHD (attention deficit hyperactivity disorder) 12/01/2020    Medication helped    Allergic     Had my whole life used to get shots    Allergies     Anxiety     Asthma     inhalers as needed    Broken bones     Depression 12/01/2020    Diabetes mellitus, type 2 01/08/2021    Essential hypertension 12/01/2020    Glucose found in urine on examination 12/01/2020    HISTORY OF GLUCOSE IN HER URINE, WE WILL CHECK AN A1C TODAY AND WE WILL SEND A URINALYSIS FOR MICROSCOPY.    HPV (human papilloma virus) infection     Migraine headache      Past Surgical History:   Procedure Laterality Date    BREAST AUGMENTATION      BREAST SURGERY  2009    LEEP N/A 09/12/2022    Procedure: LOOP ELECTROCAUTERY EXCISION PROCEDURE;  Surgeon: Esme Lambert MD;  [place] [facility] [place] [facility] [place]  Service: Gynecology;  Laterality: N/A;    VAGINAL BIOPSY        Social History     Tobacco Use    Smoking status: Former     Current packs/day: 0.00     Average  packs/day: 0.5 packs/day for 15.0 years (7.5 ttl pk-yrs)     Types: Cigarettes     Start date: 3/18/2005     Quit date: 3/1/2020     Years since quittin.3    Smokeless tobacco: Never    Tobacco comments:     STARTED SMOKING AT AGE 2; SMOKED 10 CIGS A DAY; SOME SECOND HAND SMOKE EXPOSURE   Vaping Use    Vaping status: Never Used   Substance Use Topics    Alcohol use: Yes     Comment: Rarely drink    Drug use: Never     Family History   Problem Relation Age of Onset    Parkinsonism Father     Other Father         Parkinson’s    Cancer Other     Alcohol abuse Mother     COPD Mother     Depression Mother     Anxiety disorder Mother     Learning disabilities Brother     Ovarian cancer Neg Hx     Uterine cancer Neg Hx     Breast cancer Neg Hx     Melanoma Neg Hx     Colon cancer Neg Hx     Prostate cancer Neg Hx     Clotting disorder Neg Hx        Medications:  Prior to Admission medications    Medication Sig Start Date End Date Taking? Authorizing Provider   albuterol (ACCUNEB) 1.25 MG/3ML nebulizer solution Take 3 mL by nebulization Every 6 (Six) Hours As Needed for Wheezing or Shortness of Air. 10/23/23  Yes Phuong Garcia APRN   albuterol sulfate  (90 Base) MCG/ACT inhaler inhale TWO puffs BY MOUTH EVERY 4 HOURS AS NEEDED for wheezing 6/10/24  Yes Diallo Pozo APRN   fexofenadine (Allergy Relief) 180 MG tablet Take 1 tablet by mouth Daily. 23  Yes Phuong Garcia APRN   ibuprofen (ADVIL,MOTRIN) 600 MG tablet Take 1 tablet by mouth Every 6 (Six) Hours As Needed for Mild Pain. 22  Yes Esme Lambert MD   lisinopril (PRINIVIL,ZESTRIL) 10 MG tablet TAKE TWO TABLETS BY MOUTH EVERY DAY 24  Yes Phuong Garcia APRN   montelukast (Singulair) 10 MG tablet Take 1 tablet by mouth Every Night. 23  Yes Diallo Pozo APRN   sertraline (ZOLOFT) 100 MG tablet Take 1 tablet by mouth Daily. 24  Yes Phuong Garcia APRN   sertraline (Zoloft) 50 MG  "tablet Take 1 tablet by mouth Daily. 8/25/23  Yes Phuong Garcia APRN   Trelegy Ellipta 200-62.5-25 MCG/ACT inhaler  6/12/24  Yes Provider, MD Balaji   Trulicity 1.5 MG/0.5ML solution pen-injector inject 1.5mg subcutaneously as directed once weekly 6/12/24  Yes Phuong Garcia APRN   ipratropium-albuterol (DUO-NEB) 0.5-2.5 mg/3 ml nebulizer Take 3 mL by nebulization Every 4 (Four) Hours As Needed for Wheezing for up to 30 days. 10/24/23 11/23/23  Alva Saunders MD   azithromycin (Zithromax Z-Murphy) 250 MG tablet Take 2 tablets by mouth on day 1, then 1 tablet daily on days 2-5 2/7/24 6/19/24  Sharlene Miller MD   predniSONE (DELTASONE) 20 MG tablet Take 3 tablets by mouth Daily. 2/21/24 6/19/24  Hossein Kim MD        Allergies:   Metformin    Health Maintenance Due   Topic Date Due    MAMMOGRAM  Never done    Pneumococcal Vaccine 0-64 (1 of 2 - PCV) Never done    DIABETIC EYE EXAM  Never done    Hepatitis B (1 of 3 - 19+ 3-dose series) Never done    TDAP/TD VACCINES (1 - Tdap) Never done    ZOSTER VACCINE (1 of 2) Never done    COVID-19 Vaccine (4 - 2023-24 season) 09/01/2023    DIABETIC FOOT EXAM  09/07/2023    URINE MICROALBUMIN  03/01/2024    HEMOGLOBIN A1C  04/02/2024    COLORECTAL CANCER SCREENING  04/15/2024         Vital Signs:   /76 (BP Location: Right arm, Patient Position: Sitting, Cuff Size: Adult)   Pulse 96   Temp 98 °F (36.7 °C) (Oral)   Resp 16   Ht 160 cm (63\")   Wt 60.9 kg (134 lb 3.2 oz)   SpO2 96%   BMI 23.77 kg/m²     Wt Readings from Last 3 Encounters:   06/19/24 60.9 kg (134 lb 3.2 oz)   02/21/24 67 kg (147 lb 11.3 oz)   02/07/24 64 kg (141 lb)     BP Readings from Last 3 Encounters:   06/19/24 118/76   02/21/24 141/89   02/07/24 140/90       BMI is within normal parameters. No other follow-up for BMI required.       Physical Exam  Vitals reviewed.   Constitutional:       Appearance: Normal appearance. She is well-developed.   HENT:      Head: Normocephalic " and atraumatic.   Eyes:      Conjunctiva/sclera: Conjunctivae normal.      Pupils: Pupils are equal, round, and reactive to light.   Cardiovascular:      Rate and Rhythm: Normal rate and regular rhythm.      Heart sounds: No murmur heard.     No friction rub. No gallop.   Pulmonary:      Effort: Pulmonary effort is normal.      Breath sounds: Normal breath sounds. No wheezing or rhonchi.   Abdominal:      General: Bowel sounds are normal. There is no distension.      Palpations: Abdomen is soft.      Tenderness: There is no abdominal tenderness.   Skin:     General: Skin is warm and dry.   Neurological:      Mental Status: She is alert and oriented to person, place, and time.      Cranial Nerves: No cranial nerve deficit.   Psychiatric:         Mood and Affect: Mood and affect normal.         Behavior: Behavior normal.         Thought Content: Thought content normal.         Judgment: Judgment normal.       Physical Exam        Result Review :    The following data was reviewed by AJ Perez on 06/19/24 at 17:02 EDT:    Common labs          10/2/2023    08:09 2/21/2024    09:41   Common Labs   Glucose 115  224    BUN 12  15    Creatinine 0.66  0.66    Sodium 144  140    Potassium 4.2  4.3    Chloride 108  105    Calcium 9.2  9.1    Albumin 4.5  4.2    Total Bilirubin 0.2  0.3    Alkaline Phosphatase 105  81    AST (SGOT) 36  32    ALT (SGPT) 34  31    WBC 7.45  8.78    Hemoglobin 13.8  13.6    Hematocrit 40.9  43.1    Platelets 466  400    Total Cholesterol 182     Triglycerides 83     HDL Cholesterol 82     LDL Cholesterol  85     Hemoglobin A1C 6.30       A1C Last 3 Results          10/2/2023    08:09   HGBA1C Last 3 Results   Hemoglobin A1C 6.30        XR Chest 1 View    Result Date: 2/21/2024    1. No acute cardiopulmonary disease       Yuri Tobin M.D.       Electronically Signed and Approved By: Yuri Tobin M.D. on 2/21/2024 at 10:22               Results                 Assessment and  Plan    Diagnoses and all orders for this visit:    1. Essential hypertension (Primary)    2. Type 2 diabetes mellitus with hyperglycemia, without long-term current use of insulin  -     Hemoglobin A1c  -     Microalbumin / Creatinine Urine Ratio - Urine, Clean Catch    3. Mild persistent asthma without complication  -     methylPREDNISolone (MEDROL) 4 MG dose pack; Take as directed on package instructions.  Dispense: 21 tablet; Refill: 0  -     Albuterol-Budesonide (Airsupra) 90-80 MCG/ACT aerosol; Inhale 2 puffs Every 1 (One) Hour As Needed (wheezing, shortness of breath).  Dispense: 10.7 g; Refill: 0  -     Albuterol-Budesonide (Airsupra) 90-80 MCG/ACT aerosol; Inhale 2 puffs Every 1 (One) Hour As Needed (Wheezing, shortness of breath). Maximum daily dose of 12 inhalations/day  Dispense: 32.1 g; Refill: 1       Assessment & Plan  1. Asthma  A prescription for AirSupra has been issued, with instructions for 2 puffs every 20 minutes as needed for up to 3 doses, and 2 puffs every 1 to 4 hours as needed with a maximum of 12 puffs daily. Additionally, a Medrol Dosepak has been prescribed to have on hand in case of early signs of an asthma exacerbation.    2.  Hypertension  Well-controlled on current medications, continue current medication regimen.    3. Type 2 diabetes  Well-controlled on current medications.  If patient has difficulty obtaining Trulicity 1.5 mg dose, may need to decrease dose to 0.75 mg weekly.          Smoking Cessation:    Beba Ambrose  reports that she quit smoking about 4 years ago. Her smoking use included cigarettes. She started smoking about 19 years ago. She has a 7.5 pack-year smoking history. She has never used smokeless tobacco.            Follow Up   Return in about 6 months (around 12/19/2024), or if symptoms worsen or fail to improve.  Patient was given instructions and counseling regarding her condition or for health maintenance advice. Please see specific information pulled into the  AVS if appropriate.     Please note that portions of this note were completed with a voice recognition program.    Patient or patient representative verbalized consent for the use of Ambient Listening during the visit with  AJ Perez for chart documentation. 6/19/2024  17:03 EDT

## 2024-06-20 ENCOUNTER — TELEPHONE (OUTPATIENT)
Dept: OBSTETRICS AND GYNECOLOGY | Facility: CLINIC | Age: 54
End: 2024-06-20
Payer: COMMERCIAL

## 2024-08-02 RX ORDER — LISINOPRIL 10 MG/1
20 TABLET ORAL DAILY
Qty: 30 TABLET | Refills: 5 | Status: SHIPPED | OUTPATIENT
Start: 2024-08-02

## 2024-10-16 DIAGNOSIS — E11.65 TYPE 2 DIABETES MELLITUS WITH HYPERGLYCEMIA, WITHOUT LONG-TERM CURRENT USE OF INSULIN: ICD-10-CM

## 2024-10-16 DIAGNOSIS — J45.30 MILD PERSISTENT ASTHMA WITHOUT COMPLICATION: ICD-10-CM

## 2024-10-16 DIAGNOSIS — F33.1 MODERATE EPISODE OF RECURRENT MAJOR DEPRESSIVE DISORDER: ICD-10-CM

## 2024-10-16 DIAGNOSIS — F41.9 ANXIETY: ICD-10-CM

## 2024-10-17 RX ORDER — LISINOPRIL 20 MG/1
20 TABLET ORAL DAILY
Qty: 90 TABLET | Refills: 3 | Status: SHIPPED | OUTPATIENT
Start: 2024-10-17

## 2024-10-17 RX ORDER — FLUTICASONE FUROATE, UMECLIDINIUM BROMIDE AND VILANTEROL TRIFENATATE 200; 62.5; 25 UG/1; UG/1; UG/1
1 POWDER RESPIRATORY (INHALATION)
Qty: 180 EACH | Refills: 3 | Status: SHIPPED | OUTPATIENT
Start: 2024-10-17

## 2024-10-17 RX ORDER — DULAGLUTIDE 1.5 MG/.5ML
1.5 INJECTION, SOLUTION SUBCUTANEOUS WEEKLY
Qty: 6 ML | Refills: 3 | Status: SHIPPED | OUTPATIENT
Start: 2024-10-17

## 2024-10-17 RX ORDER — PREDNISONE 20 MG/1
TABLET ORAL
Qty: 15 TABLET | Refills: 0 | Status: SHIPPED | OUTPATIENT
Start: 2024-10-17 | End: 2024-10-25

## 2024-10-17 RX ORDER — ALBUTEROL SULFATE AND BUDESONIDE 90; 80 UG/1; UG/1
2 AEROSOL, METERED RESPIRATORY (INHALATION)
Qty: 32.1 G | Refills: 1 | Status: SHIPPED | OUTPATIENT
Start: 2024-10-17

## 2024-10-17 RX ORDER — SERTRALINE HYDROCHLORIDE 100 MG/1
100 TABLET, FILM COATED ORAL DAILY
Qty: 90 TABLET | Refills: 3 | Status: SHIPPED | OUTPATIENT
Start: 2024-10-17

## 2024-11-05 ENCOUNTER — PRIOR AUTHORIZATION (OUTPATIENT)
Dept: FAMILY MEDICINE CLINIC | Facility: CLINIC | Age: 54
End: 2024-11-05
Payer: COMMERCIAL

## 2024-11-05 NOTE — TELEPHONE ENCOUNTER
Trulicity 1.5MG/0.5ML auto-injectors PA APPROVAL     PA Case ID #: [id number]  Need Help? Call us at [phone]  Outcome  Approved today by [facility] 2017  Your PA request has been approved. Additional information will be provided in the approval communication. (Message 1143)  Authorization Expiration Date: 11/5/2025

## 2024-11-18 ENCOUNTER — TELEPHONE (OUTPATIENT)
Dept: FAMILY MEDICINE CLINIC | Facility: CLINIC | Age: 54
End: 2024-11-18
Payer: COMMERCIAL

## 2024-11-18 NOTE — TELEPHONE ENCOUNTER
"Relay     \"UNFORTUNATELY ATUL RUBIN  WILL NOT BE SEEING PATIENTS IN THE AFTERNOON OF 12/19/2024 AND WE NEED TO RESCHEDULE YOUR APPOINTMENT FOR ANOTHER DAY.\"                "

## 2024-11-29 DIAGNOSIS — J45.901 MODERATE ASTHMA WITH EXACERBATION, UNSPECIFIED WHETHER PERSISTENT: ICD-10-CM

## 2024-12-02 RX ORDER — METHYLPREDNISOLONE 4 MG/1
TABLET ORAL
Qty: 21 TABLET | Refills: 0 | Status: SHIPPED | OUTPATIENT
Start: 2024-12-02

## 2024-12-02 RX ORDER — IPRATROPIUM BROMIDE AND ALBUTEROL SULFATE 2.5; .5 MG/3ML; MG/3ML
SOLUTION RESPIRATORY (INHALATION)
Qty: 360 ML | Refills: 0 | Status: SHIPPED | OUTPATIENT
Start: 2024-12-02

## 2025-01-02 DIAGNOSIS — J45.901 MODERATE ASTHMA WITH EXACERBATION, UNSPECIFIED WHETHER PERSISTENT: ICD-10-CM

## 2025-01-02 RX ORDER — METHYLPREDNISOLONE 4 MG/1
TABLET ORAL
Qty: 21 TABLET | Refills: 0 | Status: SHIPPED | OUTPATIENT
Start: 2025-01-02

## 2025-01-07 ENCOUNTER — OFFICE VISIT (OUTPATIENT)
Dept: FAMILY MEDICINE CLINIC | Facility: CLINIC | Age: 55
End: 2025-01-07
Payer: COMMERCIAL

## 2025-01-07 VITALS
TEMPERATURE: 98.1 F | WEIGHT: 141.4 LBS | DIASTOLIC BLOOD PRESSURE: 74 MMHG | SYSTOLIC BLOOD PRESSURE: 132 MMHG | HEIGHT: 63 IN | BODY MASS INDEX: 25.05 KG/M2 | OXYGEN SATURATION: 95 % | HEART RATE: 103 BPM

## 2025-01-07 DIAGNOSIS — F90.9 ATTENTION DEFICIT HYPERACTIVITY DISORDER (ADHD), UNSPECIFIED ADHD TYPE: ICD-10-CM

## 2025-01-07 DIAGNOSIS — Z12.11 COLON CANCER SCREENING: ICD-10-CM

## 2025-01-07 DIAGNOSIS — N95.1 VASOMOTOR SYMPTOMS DUE TO MENOPAUSE: ICD-10-CM

## 2025-01-07 DIAGNOSIS — F33.1 MODERATE EPISODE OF RECURRENT MAJOR DEPRESSIVE DISORDER: ICD-10-CM

## 2025-01-07 DIAGNOSIS — E11.65 TYPE 2 DIABETES MELLITUS WITH HYPERGLYCEMIA, WITHOUT LONG-TERM CURRENT USE OF INSULIN: ICD-10-CM

## 2025-01-07 DIAGNOSIS — F41.9 ANXIETY: ICD-10-CM

## 2025-01-07 DIAGNOSIS — J45.901 MODERATE ASTHMA WITH EXACERBATION, UNSPECIFIED WHETHER PERSISTENT: ICD-10-CM

## 2025-01-07 DIAGNOSIS — Z12.31 ENCOUNTER FOR SCREENING MAMMOGRAM FOR MALIGNANT NEOPLASM OF BREAST: ICD-10-CM

## 2025-01-07 DIAGNOSIS — I10 ESSENTIAL HYPERTENSION: ICD-10-CM

## 2025-01-07 DIAGNOSIS — Z00.00 ANNUAL PHYSICAL EXAM: Primary | ICD-10-CM

## 2025-01-07 DIAGNOSIS — E78.2 MIXED HYPERLIPIDEMIA: ICD-10-CM

## 2025-01-07 LAB
ALBUMIN SERPL-MCNC: 4.7 G/DL (ref 3.5–5.2)
ALBUMIN/GLOB SERPL: 1.7 G/DL
ALP SERPL-CCNC: 93 U/L (ref 39–117)
ALT SERPL W P-5'-P-CCNC: 39 U/L (ref 1–33)
ANION GAP SERPL CALCULATED.3IONS-SCNC: 9 MMOL/L (ref 5–15)
AST SERPL-CCNC: 30 U/L (ref 1–32)
BASOPHILS # BLD AUTO: 0.03 10*3/MM3 (ref 0–0.2)
BASOPHILS NFR BLD AUTO: 0.3 % (ref 0–1.5)
BILIRUB SERPL-MCNC: 0.2 MG/DL (ref 0–1.2)
BUN SERPL-MCNC: 15 MG/DL (ref 6–20)
BUN/CREAT SERPL: 21.1 (ref 7–25)
CALCIUM SPEC-SCNC: 9.5 MG/DL (ref 8.6–10.5)
CHLORIDE SERPL-SCNC: 106 MMOL/L (ref 98–107)
CHOLEST SERPL-MCNC: 220 MG/DL (ref 0–200)
CO2 SERPL-SCNC: 26 MMOL/L (ref 22–29)
CREAT SERPL-MCNC: 0.71 MG/DL (ref 0.57–1)
DEPRECATED RDW RBC AUTO: 41.2 FL (ref 37–54)
EGFRCR SERPLBLD CKD-EPI 2021: 101.8 ML/MIN/1.73
EOSINOPHIL # BLD AUTO: 0 10*3/MM3 (ref 0–0.4)
EOSINOPHIL NFR BLD AUTO: 0 % (ref 0.3–6.2)
ERYTHROCYTE [DISTWIDTH] IN BLOOD BY AUTOMATED COUNT: 12.9 % (ref 12.3–15.4)
GLOBULIN UR ELPH-MCNC: 2.8 GM/DL
GLUCOSE SERPL-MCNC: 138 MG/DL (ref 65–99)
HBA1C MFR BLD: 6.4 % (ref 4.8–5.6)
HCT VFR BLD AUTO: 40.6 % (ref 34–46.6)
HDLC SERPL-MCNC: 99 MG/DL (ref 40–60)
HGB BLD-MCNC: 14.1 G/DL (ref 12–15.9)
IMM GRANULOCYTES # BLD AUTO: 0.06 10*3/MM3 (ref 0–0.05)
IMM GRANULOCYTES NFR BLD AUTO: 0.5 % (ref 0–0.5)
LDLC SERPL CALC-MCNC: 109 MG/DL (ref 0–100)
LDLC/HDLC SERPL: 1.08 {RATIO}
LYMPHOCYTES # BLD AUTO: 1.29 10*3/MM3 (ref 0.7–3.1)
LYMPHOCYTES NFR BLD AUTO: 11.1 % (ref 19.6–45.3)
MCH RBC QN AUTO: 30.5 PG (ref 26.6–33)
MCHC RBC AUTO-ENTMCNC: 34.7 G/DL (ref 31.5–35.7)
MCV RBC AUTO: 87.7 FL (ref 79–97)
MONOCYTES # BLD AUTO: 0.54 10*3/MM3 (ref 0.1–0.9)
MONOCYTES NFR BLD AUTO: 4.6 % (ref 5–12)
NEUTROPHILS NFR BLD AUTO: 83.5 % (ref 42.7–76)
NEUTROPHILS NFR BLD AUTO: 9.74 10*3/MM3 (ref 1.7–7)
NRBC BLD AUTO-RTO: 0 /100 WBC (ref 0–0.2)
PLATELET # BLD AUTO: 501 10*3/MM3 (ref 140–450)
PMV BLD AUTO: 9.8 FL (ref 6–12)
POTASSIUM SERPL-SCNC: 4.8 MMOL/L (ref 3.5–5.2)
PROT SERPL-MCNC: 7.5 G/DL (ref 6–8.5)
RBC # BLD AUTO: 4.63 10*6/MM3 (ref 3.77–5.28)
SODIUM SERPL-SCNC: 141 MMOL/L (ref 136–145)
TRIGL SERPL-MCNC: 72 MG/DL (ref 0–150)
TSH SERPL DL<=0.05 MIU/L-ACNC: 0.29 UIU/ML (ref 0.27–4.2)
VLDLC SERPL-MCNC: 12 MG/DL (ref 5–40)
WBC NRBC COR # BLD AUTO: 11.66 10*3/MM3 (ref 3.4–10.8)

## 2025-01-07 PROCEDURE — 99214 OFFICE O/P EST MOD 30 MIN: CPT

## 2025-01-07 PROCEDURE — 80061 LIPID PANEL: CPT

## 2025-01-07 PROCEDURE — 80050 GENERAL HEALTH PANEL: CPT

## 2025-01-07 PROCEDURE — 83036 HEMOGLOBIN GLYCOSYLATED A1C: CPT

## 2025-01-07 PROCEDURE — 99396 PREV VISIT EST AGE 40-64: CPT

## 2025-01-07 RX ORDER — VENLAFAXINE HYDROCHLORIDE 37.5 MG/1
CAPSULE, EXTENDED RELEASE ORAL
Qty: 67 CAPSULE | Refills: 0 | Status: SHIPPED | OUTPATIENT
Start: 2025-01-07 | End: 2025-02-13

## 2025-01-07 NOTE — PROGRESS NOTES
Chief Complaint  Chief Complaint   Patient presents with    Annual Exam    Diabetes    Hypertension    Asthma    ADHD     Pt states she would like to be put back on an ADHD medication. She was on adderall in the past.       Subjective      Beba Ambrose presents to [facility]  History of Present Illness  The patient is a 54-year-old female who presents today for a follow-up and annual physical exam. She is following up on hypertension, diabetes, and asthma. She reports a history of ADHD and on review of her medical record, it does appear that she had previously been diagnosed with ADHD and was prescribed Adderall. She was referred over to psychiatry but was not happy with the previous psychiatrist and frequent requirements for follow-up visits.    She has a documented history of ADHD, for which she was previously prescribed Adderall. However, she discontinued the medication due to the requirement of monthly drug tests. She expresses uncertainty about the effectiveness of Zoloft in managing her symptoms, which she attributes to either ADHD, depression, or anxiety. She reports increased anxiety since the onset of the COVID-19 pandemic and finds her work environment stressful. She is open to trying a nonstimulant or an alternative treatment for her ADHD.    She continues to experience hot flashes, which have been a persistent issue for her. She recalls a previous trial of Veozah, which she found beneficial, but it was not covered by her insurance. She is seeking a medication that can alleviate her hot flashes and is covered by her insurance.    She is currently on Trulicity 1.5 once a week for diabetes management. She is also taking lisinopril 20 mg for hypertension and Zoloft 150 mg once a day. She does not think Zoloft is doing much for her. She is interested in Cologuard for colon cancer screening and a mammogram for breast cancer screening.    She has initiated the use of a steroid  pack due to weather changes affecting her breathing. She continues to use Trelegy daily and finds the Airsupra rescue inhaler beneficial.        Objective     Medical History:  Past Medical History:   Diagnosis Date    Abnormal Pap smear of cervix     Acute cystitis 07/15/2016    ADHD (attention deficit hyperactivity disorder) 2020    Medication helped    Allergic     Had my whole life used to get shots    Allergies     Anxiety     Asthma     inhalers as needed    Broken bones     Depression 2020    Diabetes mellitus, type 2 2021    Essential hypertension 2020    Glucose found in urine on examination 2020    HISTORY OF GLUCOSE IN HER URINE, WE WILL CHECK AN A1C TODAY AND WE WILL SEND A URINALYSIS FOR MICROSCOPY.    HPV (human papilloma virus) infection     Migraine headache      Past Surgical History:   Procedure Laterality Date    BREAST AUGMENTATION      BREAST SURGERY      LEEP N/A 2022    Procedure: LOOP ELECTROCAUTERY EXCISION PROCEDURE;  Surgeon: Esme Lambert MD;  [place] [facility] [place] [facility] [place]  Service: Gynecology;  Laterality: N/A;    VAGINAL BIOPSY        Social History     Tobacco Use    Smoking status: Former     Current packs/day: 0.00     Average packs/day: 0.5 packs/day for 15.0 years (7.5 ttl pk-yrs)     Types: Cigarettes     Start date: 3/18/2005     Quit date: 3/1/2020     Years since quittin.8    Smokeless tobacco: Never    Tobacco comments:     STARTED SMOKING AT AGE 2; SMOKED 10 CIGS A DAY; SOME SECOND HAND SMOKE EXPOSURE   Vaping Use    Vaping status: Never Used   Substance Use Topics    Alcohol use: Yes     Comment: Rarely drink    Drug use: Never     Family History   Problem Relation Age of Onset    Parkinsonism Father     Cancer Other     Alcohol abuse Mother     COPD Mother     Depression Mother     Anxiety disorder Mother     Learning disabilities Brother     Ovarian cancer Neg Hx     Uterine cancer Neg Hx     Breast cancer Neg Hx     Melanoma Neg  Hx     Colon cancer Neg Hx     Prostate cancer Neg Hx     Clotting disorder Neg Hx        Medications:  Prior to Admission medications    Medication Sig Start Date End Date Taking? Authorizing Provider   albuterol (ACCUNEB) 1.25 MG/3ML nebulizer solution Take 3 mL by nebulization Every 6 (Six) Hours As Needed for Wheezing or Shortness of Air. 10/23/23   Phuong Garcia APRN   Albuterol-Budesonide (Airsupra) 90-80 MCG/ACT aerosol Inhale 2 puffs Every 1 (One) Hour As Needed (wheezing, shortness of breath). 6/19/24   Phuong Garcia APRN   Albuterol-Budesonide (Airsupra) 90-80 MCG/ACT aerosol Inhale 2 puffs Every 1 (One) Hour As Needed (Wheezing, shortness of breath). Maximum daily dose of 12 inhalations/day 10/17/24   Phuong Garcia APRN   Dulaglutide (Trulicity) 1.5 MG/0.5ML solution pen-injector Inject 1.5 mg under the skin into the appropriate area as directed 1 (One) Time Per Week. 10/17/24   Phuong Garcia APRN   fexofenadine (Allergy Relief) 180 MG tablet Take 1 tablet by mouth Daily. 8/25/23   Phuong Garcia APRN   ibuprofen (ADVIL,MOTRIN) 600 MG tablet Take 1 tablet by mouth Every 6 (Six) Hours As Needed for Mild Pain. 9/12/22   Esme Lambert MD   ipratropium-albuterol (DUO-NEB) 0.5-2.5 mg/3 ml nebulizer USE 1 AMPULE IN NEBULIZER EVERY 4 HOURS AS NEEDED FOR WHEEZING FOR  UP  TO  30  DAYS 12/2/24   Phuong Garcia APRN   lisinopril (PRINIVIL,ZESTRIL) 20 MG tablet Take 1 tablet by mouth Daily. 10/17/24   Phuong Garcia APRN   methylPREDNISolone (MEDROL) 4 MG dose pack TAKE BY MOUTH AS DIRECTED ON INSIDE OF PACKAGE 1/2/25   Phuong Garcia APRN   montelukast (Singulair) 10 MG tablet Take 1 tablet by mouth Every Night. 2/16/23   Diallo Pozo APRN   sertraline (ZOLOFT) 100 MG tablet Take 1 tablet by mouth Daily. 10/17/24   Phuong Garcia APRN   sertraline (Zoloft) 50 MG tablet Take 1 tablet by mouth Daily. 10/17/24   Jose  "AJ Baxter   Trelegy Ellipta 200-62.5-25 MCG/ACT inhaler Inhale 1 puff Daily. 10/17/24   Phuong GarciaAJ        Allergies:   Metformin    Health Maintenance Due   Topic Date Due    Pneumococcal Vaccine 0-64 (1 of 2 - PCV) Never done    DIABETIC EYE EXAM  Never done    Hepatitis B (1 of 3 - 19+ 3-dose series) Never done    TDAP/TD VACCINES (1 - Tdap) Never done    MAMMOGRAM  Never done    ZOSTER VACCINE (1 of 2) Never done    DIABETIC FOOT EXAM  09/07/2023    COLORECTAL CANCER SCREENING  04/15/2024    COVID-19 Vaccine (4 - 2024-25 season) 09/01/2024    ANNUAL PHYSICAL  10/02/2024    LIPID PANEL  10/02/2024    HEMOGLOBIN A1C  12/19/2024         Vital Signs:   /74 (BP Location: Left arm, Patient Position: Sitting, Cuff Size: Adult)   Pulse 103   Temp 98.1 °F (36.7 °C) (Oral)   Ht 160 cm (63\")   Wt 64.1 kg (141 lb 6.4 oz)   SpO2 95%   BMI 25.05 kg/m²     Wt Readings from Last 3 Encounters:   01/07/25 64.1 kg (141 lb 6.4 oz)   06/19/24 60.9 kg (134 lb 3.2 oz)   02/21/24 67 kg (147 lb 11.3 oz)     BP Readings from Last 3 Encounters:   01/07/25 132/74   06/19/24 118/76   02/21/24 141/89       BMI is within normal parameters. No other follow-up for BMI required.       Physical Exam  Vitals reviewed.   Constitutional:       Appearance: Normal appearance. She is well-developed.   HENT:      Head: Normocephalic and atraumatic.   Eyes:      Conjunctiva/sclera: Conjunctivae normal.      Pupils: Pupils are equal, round, and reactive to light.   Cardiovascular:      Rate and Rhythm: Normal rate and regular rhythm.      Heart sounds: No murmur heard.     No friction rub. No gallop.   Pulmonary:      Effort: Pulmonary effort is normal.      Breath sounds: Normal breath sounds. No wheezing or rhonchi.   Abdominal:      General: Bowel sounds are normal. There is no distension.      Palpations: Abdomen is soft.      Tenderness: There is no abdominal tenderness.   Skin:     General: Skin is warm and " dry.   Neurological:      Mental Status: She is alert and oriented to person, place, and time.      Cranial Nerves: No cranial nerve deficit.   Psychiatric:         Mood and Affect: Mood and affect normal.         Behavior: Behavior normal.         Thought Content: Thought content normal.         Judgment: Judgment normal.       Physical Exam  Lungs are clear. No wheezing.    Vital Signs  Blood pressure is normal.      Result Review :    The following data was reviewed by AJ Perez on 01/07/25 at 15:37 EST:    Common labs          2/21/2024    09:41 6/19/2024    16:03 6/19/2024    16:43   Common Labs   Glucose 224      BUN 15      Creatinine 0.66      Sodium 140      Potassium 4.3      Chloride 105      Calcium 9.1      Albumin 4.2      Total Bilirubin 0.3      Alkaline Phosphatase 81      AST (SGOT) 32      ALT (SGPT) 31      WBC 8.78      Hemoglobin 13.6      Hematocrit 43.1      Platelets 400      Hemoglobin A1C   6.20    Microalbumin, Urine  1.4           No Images in the past 120 days found..    Results  Laboratory Studies  A1c was stable at 6.2 in February 2024. Cholesterol levels were good in October 2023.               Assessment and Plan    Diagnoses and all orders for this visit:    1. Annual physical exam (Primary)  -     CBC & Differential  -     Comprehensive Metabolic Panel  -     Lipid Panel  -     TSH Rfx On Abnormal To Free T4    2. Essential hypertension    3. Mixed hyperlipidemia    4. Moderate episode of recurrent major depressive disorder    5. Anxiety  -     venlafaxine XR (Effexor XR) 37.5 MG 24 hr capsule; Take 1 capsule by mouth Daily for 7 days, THEN 2 capsules Daily for 30 days.  Dispense: 67 capsule; Refill: 0    6. Type 2 diabetes mellitus with hyperglycemia, without long-term current use of insulin  -     Hemoglobin A1c    7. Moderate asthma with exacerbation, unspecified whether persistent    8. Colon cancer screening  -     Cologuard - Stool, Per Rectum; Future    9.  Vasomotor symptoms due to menopause  -     venlafaxine XR (Effexor XR) 37.5 MG 24 hr capsule; Take 1 capsule by mouth Daily for 7 days, THEN 2 capsules Daily for 30 days.  Dispense: 67 capsule; Refill: 0    10. Attention deficit hyperactivity disorder (ADHD), unspecified ADHD type  -     venlafaxine XR (Effexor XR) 37.5 MG 24 hr capsule; Take 1 capsule by mouth Daily for 7 days, THEN 2 capsules Daily for 30 days.  Dispense: 67 capsule; Refill: 0    11. Encounter for screening mammogram for malignant neoplasm of breast  -     Mammo Screening Digital Tomosynthesis Bilateral With CAD; Future       Assessment & Plan  1. Attention deficit hyperactivity disorder (ADHD).  Her Zoloft may not be effective due to a potential misdiagnosis. Strattera, a nonstimulant medication specifically for ADHD, was discussed as a treatment option. It is a norepinephrine reuptake inhibitor SNRI similar to Cymbalta and Effexor. She was informed that approximately half of the patients experience intense nausea with this medication, but it tends to improve over time. Insurance coverage for Strattera was also discussed. Effexor, an SNRI known to help with vasomotor symptoms like hot flashes, will be tried before Strattera. Effexor is also used for anxiety, panic disorder, and obsessive-compulsive disorder, which are similar to her ADHD symptoms. A prescription for Effexor 75 mg daily was provided, starting with a 37.5 mg capsule once daily for 7 days, then increasing to 2 capsules as the maintenance dose. This regimen will be followed for a month. If there is no improvement in her hot flashes, mood, anxiety, or concentration, the treatment plan will be reassessed. She will gradually reduce her Zoloft dosage from 150 mg to 100 mg for a few days to a week, then to 50 mg for a few days to a week, and finally to every other day. This will be done while starting Effexor to ensure she continues to receive some serotonin and norepinephrine. She was  advised against abrupt discontinuation of Zoloft due to potential withdrawal symptoms.    2. Hot flashes.  Effexor, an SNRI, will be tried to help with hot flashes. SNRIs can sometimes help with hot flashes. She has not taken Effexor in the past. A prescription for Effexor 75 mg daily was provided, starting with a 37.5 mg capsule once daily for 7 days, then increasing to 2 capsules as the maintenance dose. This regimen will be followed for a month. If there is no improvement in her hot flashes, the treatment plan will be reassessed.    3. Hypertension.  Her blood pressure is well-controlled on the current regimen of lisinopril 20 mg daily. She should continue this medication as prescribed.    4. Diabetes mellitus.  Her A1c was stable at 6.2 in February 2024. She is currently on Trulicity 1.5 mg once a week. Blood work will be conducted today to recheck her A1c levels.    5. Asthma.  Her lung sounds are clear, and she reports no issues with her current regimen. She is using Trelegy daily and Airsupra as a rescue inhaler. She should continue using these medications as prescribed.    6. Health Maintenance.  Orders for Cologuard for colon cancer screening and a mammogram for breast cancer screening have been placed. Cologuard will be sent to her house, and she will be contacted to schedule the mammogram.            Smoking Cessation:    Beba Ambrose  reports that she quit smoking about 4 years ago. Her smoking use included cigarettes. She started smoking about 19 years ago. She has a 7.5 pack-year smoking history. She has never used smokeless tobacco.          Follow Up   Return in about 3 months (around 4/7/2025) for Next scheduled follow up.  Patient was given instructions and counseling regarding her condition or for health maintenance advice. Please see specific information pulled into the AVS if appropriate.     Please note that portions of this note were completed with a voice recognition program.    Patient or  patient representative verbalized consent for the use of Ambient Listening during the visit with  AJ Perez for chart documentation. 1/7/2025  15:35 EST

## 2025-01-08 DIAGNOSIS — E78.2 MIXED HYPERLIPIDEMIA: Primary | ICD-10-CM

## 2025-01-08 RX ORDER — ATORVASTATIN CALCIUM 10 MG/1
10 TABLET, FILM COATED ORAL DAILY
Qty: 90 TABLET | Refills: 1 | Status: SHIPPED | OUTPATIENT
Start: 2025-01-08

## 2025-01-11 DIAGNOSIS — J45.901 MODERATE ASTHMA WITH EXACERBATION, UNSPECIFIED WHETHER PERSISTENT: ICD-10-CM

## 2025-01-11 DIAGNOSIS — J45.30 MILD PERSISTENT ASTHMA WITHOUT COMPLICATION: ICD-10-CM

## 2025-01-13 RX ORDER — IPRATROPIUM BROMIDE AND ALBUTEROL SULFATE 2.5; .5 MG/3ML; MG/3ML
SOLUTION RESPIRATORY (INHALATION)
Qty: 360 ML | Refills: 0 | Status: SHIPPED | OUTPATIENT
Start: 2025-01-13

## 2025-01-15 RX ORDER — FLUTICASONE FUROATE, UMECLIDINIUM BROMIDE AND VILANTEROL TRIFENATATE 200; 62.5; 25 UG/1; UG/1; UG/1
POWDER RESPIRATORY (INHALATION)
Qty: 60 EACH | Refills: 0 | Status: SHIPPED | OUTPATIENT
Start: 2025-01-15

## 2025-01-16 DIAGNOSIS — J45.901 MODERATE ASTHMA WITH EXACERBATION, UNSPECIFIED WHETHER PERSISTENT: ICD-10-CM

## 2025-01-16 RX ORDER — METHYLPREDNISOLONE 4 MG/1
TABLET ORAL
Qty: 21 TABLET | Refills: 0 | Status: SHIPPED | OUTPATIENT
Start: 2025-01-16

## 2025-01-17 ENCOUNTER — OFFICE VISIT (OUTPATIENT)
Dept: FAMILY MEDICINE CLINIC | Facility: CLINIC | Age: 55
End: 2025-01-17
Payer: COMMERCIAL

## 2025-01-17 ENCOUNTER — PATIENT MESSAGE (OUTPATIENT)
Dept: FAMILY MEDICINE CLINIC | Facility: CLINIC | Age: 55
End: 2025-01-17

## 2025-01-17 VITALS
DIASTOLIC BLOOD PRESSURE: 68 MMHG | TEMPERATURE: 98 F | BODY MASS INDEX: 24.33 KG/M2 | HEIGHT: 63 IN | HEART RATE: 111 BPM | SYSTOLIC BLOOD PRESSURE: 112 MMHG | WEIGHT: 137.3 LBS | OXYGEN SATURATION: 97 %

## 2025-01-17 DIAGNOSIS — R10.9 ABDOMINAL DISCOMFORT: ICD-10-CM

## 2025-01-17 DIAGNOSIS — J45.901 MODERATE ASTHMA WITH EXACERBATION, UNSPECIFIED WHETHER PERSISTENT: Primary | ICD-10-CM

## 2025-01-17 DIAGNOSIS — J06.9 UPPER RESPIRATORY TRACT INFECTION, UNSPECIFIED TYPE: ICD-10-CM

## 2025-01-17 PROCEDURE — 99214 OFFICE O/P EST MOD 30 MIN: CPT

## 2025-01-17 RX ORDER — ONDANSETRON 4 MG/1
4 TABLET, ORALLY DISINTEGRATING ORAL EVERY 8 HOURS PRN
Qty: 16 TABLET | Refills: 0 | Status: SHIPPED | OUTPATIENT
Start: 2025-01-17

## 2025-01-17 RX ORDER — AZITHROMYCIN 250 MG/1
TABLET, FILM COATED ORAL
Qty: 6 TABLET | Refills: 0 | Status: SHIPPED | OUTPATIENT
Start: 2025-01-17

## 2025-01-17 NOTE — LETTER
January 17, 2025     Patient: Beba Ambrose   YOB: 1970   Date of Visit: 1/17/2025       To Whom It May Concern:    Please excuse Beba Ambrose from work due to illness from 1/14/2025 through 1/17/2025. It is my medical opinion that Beba Ambrose may return to work on 1/20/2025.         Sincerely,        AJ Perez    CC: No Recipients

## 2025-01-17 NOTE — PROGRESS NOTES
Chief Complaint  Chief Complaint   Patient presents with    Asthma    Heartburn     Pt c/o abdominal discomfort and heartburn x 4 days       Subjective      Beba Ambrose presents to [facility]  History of Present Illness  The patient is a 54-year-old female who presents today to discuss asthma.    She reports an overall feeling of malaise, with symptoms including congestion and a productive cough. She has not yet started the steroid treatment prescribed during her last visit, as she does not perceive an immediate need for it. She has been experiencing gastrointestinal discomfort, which she describes as a sour stomach, but does not report any associated vomiting or diarrhea. She has attempted to manage this discomfort with antacids, but without significant relief. She notes that burping provides some temporary relief. Despite these symptoms, she maintains regular eating habits. She also reports a lack of significant mucus production, although she is expectorating some material. She has been sleeping in a hospital bed to prevent postnasal drainage due to her asthma. She expresses concern about potential weather changes and their impact on her condition. She has previously responded well to Z-Murphy for similar symptoms.          Objective     Medical History:  Past Medical History:   Diagnosis Date    Abnormal Pap smear of cervix     Acute cystitis 07/15/2016    ADHD (attention deficit hyperactivity disorder) 12/01/2020    Medication helped    Allergic     Had my whole life used to get shots    Allergies     Anxiety     Asthma     inhalers as needed    Broken bones     Depression 12/01/2020    Diabetes mellitus, type 2 01/08/2021    Essential hypertension 12/01/2020    Glucose found in urine on examination 12/01/2020    HISTORY OF GLUCOSE IN HER URINE, WE WILL CHECK AN A1C TODAY AND WE WILL SEND A URINALYSIS FOR MICROSCOPY.    HPV (human papilloma virus) infection     Migraine headache       Past Surgical History:   Procedure Laterality Date    BREAST AUGMENTATION      BREAST SURGERY      LEEP N/A 2022    Procedure: LOOP ELECTROCAUTERY EXCISION PROCEDURE;  Surgeon: Esme Lambert MD;  Location: [facility] OR [facility] [place];  Service: Gynecology;  Laterality: N/A;    VAGINAL BIOPSY        Social History     Tobacco Use    Smoking status: Former     Current packs/day: 0.00     Average packs/day: 0.5 packs/day for 15.0 years (7.5 ttl pk-yrs)     Types: Cigarettes     Start date: 3/18/2005     Quit date: 3/1/2020     Years since quittin.8    Smokeless tobacco: Never    Tobacco comments:     STARTED SMOKING AT AGE 2; SMOKED 10 CIGS A DAY; SOME SECOND HAND SMOKE EXPOSURE   Vaping Use    Vaping status: Never Used   Substance Use Topics    Alcohol use: Yes     Comment: Rarely drink    Drug use: Never     Family History   Problem Relation Age of Onset    Parkinsonism Father     Cancer Other     Alcohol abuse Mother     COPD Mother     Depression Mother     Anxiety disorder Mother     Learning disabilities Brother     Ovarian cancer Neg Hx     Uterine cancer Neg Hx     Breast cancer Neg Hx     Melanoma Neg Hx     Colon cancer Neg Hx     Prostate cancer Neg Hx     Clotting disorder Neg Hx        Medications:  Prior to Admission medications    Medication Sig Start Date End Date Taking? Authorizing Provider   albuterol (ACCUNEB) 1.25 MG/3ML nebulizer solution Take 3 mL by nebulization Every 6 (Six) Hours As Needed for Wheezing or Shortness of Air. 10/23/23  Yes Phuong Garcia APRN   Albuterol-Budesonide (Airsupra) 90-80 MCG/ACT aerosol Inhale 2 puffs Every 1 (One) Hour As Needed (Wheezing, shortness of breath). Maximum daily dose of 12 inhalations/day 10/17/24  Yes Phuong Garcia APRN   atorvastatin (Lipitor) 10 MG tablet Take 1 tablet by mouth Daily. 25  Yes Phuong Garcia APRN   Dulaglutide (Trulicity) 1.5 MG/0.5ML solution pen-injector Inject 1.5 mg under the skin into the  "appropriate area as directed 1 (One) Time Per Week. 10/17/24  Yes Phuong Garcia APRN   fexofenadine (Allergy Relief) 180 MG tablet Take 1 tablet by mouth Daily. 8/25/23  Yes Puhong Garcia APRN   ibuprofen (ADVIL,MOTRIN) 600 MG tablet Take 1 tablet by mouth Every 6 (Six) Hours As Needed for Mild Pain. 9/12/22  Yes Esme Lambert MD   ipratropium-albuterol (DUO-NEB) 0.5-2.5 mg/3 ml nebulizer USE 1 AMPULE IN NEBULIZER 4 TIMES DAILY AS NEEDED FOR  WHEEZING  FOR  UP  TO  30  DAYS 1/13/25  Yes Phuong Garcia APRN   lisinopril (PRINIVIL,ZESTRIL) 20 MG tablet Take 1 tablet by mouth Daily. 10/17/24  Yes Phuong Garcia APRN   methylPREDNISolone (MEDROL) 4 MG dose pack TAKE BY MOUTH AS DIRECTED ON INSIDE OF PACKAGE 1/16/25  Yes Phuong Garcia APRN   montelukast (Singulair) 10 MG tablet Take 1 tablet by mouth Every Night. 2/16/23  Yes Diallo Pozo APRN   Trelegy Ellipta 200-62.5-25 MCG/ACT inhaler INHALE 1 PUFF BY MOUTH ONCE DAILY FOR 30 DAYS 1/15/25  Yes Phuong Garcia APRN   venlafaxine XR (Effexor XR) 37.5 MG 24 hr capsule Take 1 capsule by mouth Daily for 7 days, THEN 2 capsules Daily for 30 days. 1/7/25 2/13/25 Yes Phuong Garcia APRN        Allergies:   Metformin    Health Maintenance Due   Topic Date Due    Pneumococcal Vaccine 0-64 (1 of 2 - PCV) Never done    DIABETIC EYE EXAM  Never done    Hepatitis B (1 of 3 - 19+ 3-dose series) Never done    TDAP/TD VACCINES (1 - Tdap) Never done    MAMMOGRAM  Never done    ZOSTER VACCINE (1 of 2) Never done    DIABETIC FOOT EXAM  09/07/2023    COLORECTAL CANCER SCREENING  04/15/2024    COVID-19 Vaccine (4 - 2024-25 season) 09/01/2024         Vital Signs:   /68 (BP Location: Left arm, Patient Position: Sitting, Cuff Size: Adult)   Pulse 111   Temp 98 °F (36.7 °C) (Oral)   Ht 160 cm (63\")   Wt 62.3 kg (137 lb 4.8 oz)   SpO2 97%   BMI 24.32 kg/m²     Wt Readings from Last 3 Encounters:   01/17/25 " 62.3 kg (137 lb 4.8 oz)   01/07/25 64.1 kg (141 lb 6.4 oz)   06/19/24 60.9 kg (134 lb 3.2 oz)     BP Readings from Last 3 Encounters:   01/17/25 112/68   01/07/25 132/74   06/19/24 118/76       BMI is within normal parameters. No other follow-up for BMI required.       Physical Exam  Vitals reviewed.   Constitutional:       Appearance: Normal appearance. She is well-developed.   HENT:      Head: Normocephalic and atraumatic.   Eyes:      Conjunctiva/sclera: Conjunctivae normal.      Pupils: Pupils are equal, round, and reactive to light.   Cardiovascular:      Rate and Rhythm: Regular rhythm. Tachycardia present.      Heart sounds: No murmur heard.     No friction rub. No gallop.   Pulmonary:      Effort: Pulmonary effort is normal.      Breath sounds: Examination of the left-upper field reveals rhonchi. Rhonchi present. No wheezing.   Abdominal:      General: Bowel sounds are normal. There is no distension.      Palpations: Abdomen is soft.      Tenderness: There is no abdominal tenderness.   Skin:     General: Skin is warm and dry.   Neurological:      Mental Status: She is alert and oriented to person, place, and time.      Cranial Nerves: No cranial nerve deficit.   Psychiatric:         Mood and Affect: Mood and affect normal.         Behavior: Behavior normal.         Thought Content: Thought content normal.         Judgment: Judgment normal.       Physical Exam      Vital Signs  Heart rate is high.      Result Review :    The following data was reviewed by AJ Perez on 01/17/25 at 17:16 EST:        No Images in the past 120 days found..    Results                 Assessment and Plan    Diagnoses and all orders for this visit:    1. Moderate asthma with exacerbation, unspecified whether persistent (Primary)  -     azithromycin (Zithromax Z-Murphy) 250 MG tablet; Take 2 tablets by mouth on day 1, then 1 tablet daily on days 2-5  Dispense: 6 tablet; Refill: 0    2. Abdominal discomfort  -      ondansetron ODT (ZOFRAN-ODT) 4 MG disintegrating tablet; Place 1 tablet on the tongue Every 8 (Eight) Hours As Needed for Nausea or Vomiting.  Dispense: 16 tablet; Refill: 0    3. Upper respiratory tract infection, unspecified type  -     azithromycin (Zithromax Z-Murphy) 250 MG tablet; Take 2 tablets by mouth on day 1, then 1 tablet daily on days 2-5  Dispense: 6 tablet; Refill: 0       Assessment & Plan  1. Asthma.  She presents with congestion and a productive cough, but no wheezing. Her heart rate is elevated. Given her symptoms and the upcoming weekend, there is a risk of developing a severe upper respiratory infection with an asthma exacerbation. A prescription for Z-Murphy has been issued. She is advised to collect her steroid medication but to refrain from using it unless necessary. Additionally, a prescription for Zofran has been provided to alleviate her stomach discomfort. A work note has been provided for Tuesday, Wednesday, Thursday, and Friday.          Smoking Cessation:    Beba Ambrose  reports that she quit smoking about 4 years ago. Her smoking use included cigarettes. She started smoking about 19 years ago. She has a 7.5 pack-year smoking history. She has never used smokeless tobacco.             Follow Up   Return if symptoms worsen or fail to improve.  Patient was given instructions and counseling regarding her condition or for health maintenance advice. Please see specific information pulled into the AVS if appropriate.     Please note that portions of this note were completed with a voice recognition program.    Patient or patient representative verbalized consent for the use of Ambient Listening during the visit with  AJ Perez for chart documentation. 1/17/2025  17:16 EST

## 2025-02-12 DIAGNOSIS — J45.30 MILD PERSISTENT ASTHMA WITHOUT COMPLICATION: ICD-10-CM

## 2025-02-13 RX ORDER — FLUTICASONE FUROATE, UMECLIDINIUM BROMIDE AND VILANTEROL TRIFENATATE 200; 62.5; 25 UG/1; UG/1; UG/1
1 POWDER RESPIRATORY (INHALATION) DAILY
Qty: 60 EACH | Refills: 2 | Status: SHIPPED | OUTPATIENT
Start: 2025-02-13

## 2025-02-28 DIAGNOSIS — F90.9 ATTENTION DEFICIT HYPERACTIVITY DISORDER (ADHD), UNSPECIFIED ADHD TYPE: ICD-10-CM

## 2025-02-28 DIAGNOSIS — F41.9 ANXIETY: ICD-10-CM

## 2025-02-28 DIAGNOSIS — N95.1 VASOMOTOR SYMPTOMS DUE TO MENOPAUSE: ICD-10-CM

## 2025-02-28 DIAGNOSIS — J45.30 MILD PERSISTENT ASTHMA WITHOUT COMPLICATION: ICD-10-CM

## 2025-02-28 RX ORDER — VENLAFAXINE HYDROCHLORIDE 37.5 MG/1
CAPSULE, EXTENDED RELEASE ORAL
Qty: 67 CAPSULE | Refills: 1 | Status: SHIPPED | OUTPATIENT
Start: 2025-02-28 | End: 2025-04-06

## 2025-02-28 RX ORDER — ALBUTEROL SULFATE AND BUDESONIDE 90; 80 UG/1; UG/1
AEROSOL, METERED RESPIRATORY (INHALATION)
Qty: 32.1 G | Refills: 1 | Status: SHIPPED | OUTPATIENT
Start: 2025-02-28

## 2025-04-09 ENCOUNTER — OFFICE VISIT (OUTPATIENT)
Dept: FAMILY MEDICINE CLINIC | Facility: CLINIC | Age: 55
End: 2025-04-09
Payer: COMMERCIAL

## 2025-04-09 VITALS
BODY MASS INDEX: 25.21 KG/M2 | SYSTOLIC BLOOD PRESSURE: 116 MMHG | HEART RATE: 95 BPM | OXYGEN SATURATION: 99 % | WEIGHT: 142.3 LBS | HEIGHT: 63 IN | TEMPERATURE: 98.4 F | DIASTOLIC BLOOD PRESSURE: 58 MMHG

## 2025-04-09 DIAGNOSIS — J45.30 MILD PERSISTENT ASTHMA WITHOUT COMPLICATION: ICD-10-CM

## 2025-04-09 DIAGNOSIS — E11.65 TYPE 2 DIABETES MELLITUS WITH HYPERGLYCEMIA, WITHOUT LONG-TERM CURRENT USE OF INSULIN: ICD-10-CM

## 2025-04-09 DIAGNOSIS — F41.9 ANXIETY: ICD-10-CM

## 2025-04-09 DIAGNOSIS — R35.0 URINARY FREQUENCY: ICD-10-CM

## 2025-04-09 DIAGNOSIS — J45.901 MODERATE ASTHMA WITH EXACERBATION, UNSPECIFIED WHETHER PERSISTENT: ICD-10-CM

## 2025-04-09 DIAGNOSIS — N95.1 VASOMOTOR SYMPTOMS DUE TO MENOPAUSE: ICD-10-CM

## 2025-04-09 DIAGNOSIS — F33.1 MODERATE EPISODE OF RECURRENT MAJOR DEPRESSIVE DISORDER: ICD-10-CM

## 2025-04-09 DIAGNOSIS — E78.2 MIXED HYPERLIPIDEMIA: ICD-10-CM

## 2025-04-09 DIAGNOSIS — R30.0 DYSURIA: ICD-10-CM

## 2025-04-09 DIAGNOSIS — I10 ESSENTIAL HYPERTENSION: Primary | ICD-10-CM

## 2025-04-09 LAB
BILIRUB BLD-MCNC: NEGATIVE MG/DL
CLARITY, POC: CLEAR
COLOR UR: YELLOW
GLUCOSE UR STRIP-MCNC: NEGATIVE MG/DL
KETONES UR QL: NEGATIVE
LEUKOCYTE EST, POC: NEGATIVE
NITRITE UR-MCNC: NEGATIVE MG/ML
PH UR: 5.5 [PH] (ref 5–8)
PROT UR STRIP-MCNC: NEGATIVE MG/DL
RBC # UR STRIP: ABNORMAL /UL
SP GR UR: 1.03 (ref 1–1.03)
UROBILINOGEN UR QL: NORMAL

## 2025-04-09 PROCEDURE — 99214 OFFICE O/P EST MOD 30 MIN: CPT

## 2025-04-09 PROCEDURE — 81002 URINALYSIS NONAUTO W/O SCOPE: CPT

## 2025-04-09 RX ORDER — METHYLPREDNISOLONE 4 MG/1
TABLET ORAL
Qty: 21 TABLET | Refills: 0 | Status: SHIPPED | OUTPATIENT
Start: 2025-04-09

## 2025-04-09 RX ORDER — ALBUTEROL SULFATE AND BUDESONIDE 90; 80 UG/1; UG/1
1 AEROSOL, METERED RESPIRATORY (INHALATION)
Qty: 32.1 G | Refills: 1 | Status: SHIPPED | OUTPATIENT
Start: 2025-04-09

## 2025-04-09 NOTE — PROGRESS NOTES
Chief Complaint  Chief Complaint   Patient presents with    Diabetes    Hypertension    Asthma    Urinary Frequency     X 2 days       Subjective      Beba Ambrose presents to [facility]  History of Present Illness  The patient is a 55-year-old female who presents today to follow up on hypertension, hyperlipidemia, type 2 diabetes, asthma, depression, and anxiety.    She reports experiencing urinary frequency and dysuria, which she identifies as the initial symptoms of her condition.    Her respiratory function remains stable, and she continues to use Trelegy. She has requested a refill of her steroid medication.    She has been diagnosed with ADHD and was previously on Adderall but discontinued it due to the inconvenience of monthly drug screens and refills. She continues to struggle with ADHD symptoms, including procrastination. She is not interested in resuming Adderall therapy at this time.    She had been on Zoloft but felt it was not beneficial for her. When seen in 01/2025, she was prescribed Effexor in hopes of improving her depression and anxiety-related symptoms. This was also intended to help with vasomotor symptoms such as hot flashes, as she was previously on Veozah, but that is no longer covered by her insurance. She has initiated Effexor therapy and reports an overall improvement in her condition.    Her blood pressure is well controlled on lisinopril 20 mg daily. Blood pressure today is appropriate at 116/58.    She is on Trulicity 1.5 mg once weekly for diabetes, and her most recent A1c in 01/2025 was at 6.4% and stable.    She has been on atorvastatin for 3 months and reports no adverse effects.    MEDICATIONS  Current: lisinopril, Trulicity, Trelegy, Effexor, atorvastatin  Discontinued: Adderall, Zoloft, Veozah      Objective     Medical History:  Past Medical History:   Diagnosis Date    Abnormal Pap smear of cervix     Acute cystitis 07/15/2016    ADHD (attention  deficit hyperactivity disorder) 2020    Medication helped    Allergic     Had my whole life used to get shots    Allergies     Anxiety     Asthma     inhalers as needed    Broken bones     Depression 2020    Diabetes mellitus, type 2 2021    Essential hypertension 2020    Glucose found in urine on examination 2020    HISTORY OF GLUCOSE IN HER URINE, WE WILL CHECK AN A1C TODAY AND WE WILL SEND A URINALYSIS FOR MICROSCOPY.    HPV (human papilloma virus) infection     Migraine headache      Past Surgical History:   Procedure Laterality Date    BREAST AUGMENTATION      BREAST SURGERY      LEEP N/A 2022    Procedure: LOOP ELECTROCAUTERY EXCISION PROCEDURE;  Surgeon: Esme Lambert MD;  [place] [facility] [place] [facility] [place]  Service: Gynecology;  Laterality: N/A;    VAGINAL BIOPSY        Social History     Tobacco Use    Smoking status: Former     Current packs/day: 0.00     Average packs/day: 0.5 packs/day for 15.0 years (7.5 ttl pk-yrs)     Types: Cigarettes     Start date: 3/18/2005     Quit date: 3/1/2020     Years since quittin.1    Smokeless tobacco: Never    Tobacco comments:     STARTED SMOKING AT AGE 2; SMOKED 10 CIGS A DAY; SOME SECOND HAND SMOKE EXPOSURE   Vaping Use    Vaping status: Never Used   Substance Use Topics    Alcohol use: Yes     Comment: Rarely drink    Drug use: Never     Family History   Problem Relation Age of Onset    Parkinsonism Father     Cancer Other     Alcohol abuse Mother     COPD Mother     Depression Mother     Anxiety disorder Mother     Learning disabilities Brother     Ovarian cancer Neg Hx     Uterine cancer Neg Hx     Breast cancer Neg Hx     Melanoma Neg Hx     Colon cancer Neg Hx     Prostate cancer Neg Hx     Clotting disorder Neg Hx        Medications:  Prior to Admission medications    Medication Sig Start Date End Date Taking? Authorizing Provider   albuterol (ACCUNEB) 1.25 MG/3ML nebulizer solution Take 3 mL by nebulization Every 6 (Six)  Hours As Needed for Wheezing or Shortness of Air. 10/23/23  Yes Phuong Garcia APRN   Albuterol-Budesonide (Airsupra) 90-80 MCG/ACT aerosol Inhale 2 puffs Every 1 Hour As Needed (Wheezing, shortness of breath). Maximum daily dose of 12 inhalations/day 2/28/25  Yes Phuong Garcia APRN   atorvastatin (Lipitor) 10 MG tablet Take 1 tablet by mouth Daily. 1/8/25  Yes Phuong Garcia APRN   azithromycin (Zithromax Z-Murphy) 250 MG tablet Take 2 tablets by mouth on day 1, then 1 tablet daily on days 2-5 1/17/25  Yes Phuong Garcia APRN   Dulaglutide (Trulicity) 1.5 MG/0.5ML solution pen-injector Inject 1.5 mg under the skin into the appropriate area as directed 1 (One) Time Per Week. 10/17/24  Yes Phuong Garcia APRN   fexofenadine (Allergy Relief) 180 MG tablet Take 1 tablet by mouth Daily. 8/25/23  Yes Phuong Garcia APRN   ibuprofen (ADVIL,MOTRIN) 600 MG tablet Take 1 tablet by mouth Every 6 (Six) Hours As Needed for Mild Pain. 9/12/22  Yes Esme Lambert MD   ipratropium-albuterol (DUO-NEB) 0.5-2.5 mg/3 ml nebulizer USE 1 AMPULE IN NEBULIZER 4 TIMES DAILY AS NEEDED FOR  WHEEZING  FOR  UP  TO  30  DAYS 1/13/25  Yes Phuong Garcia APRN   lisinopril (PRINIVIL,ZESTRIL) 20 MG tablet Take 1 tablet by mouth Daily. 10/17/24  Yes Phuong Garcia APRN   montelukast (Singulair) 10 MG tablet Take 1 tablet by mouth Every Night. 2/16/23  Yes Diallo Pozo APRN   ondansetron ODT (ZOFRAN-ODT) 4 MG disintegrating tablet Place 1 tablet on the tongue Every 8 (Eight) Hours As Needed for Nausea or Vomiting. 1/17/25  Yes Phuong Garcia APRN   Trelegy Ellipta 200-62.5-25 MCG/ACT inhaler Inhale 1 puff by mouth once daily 2/13/25  Yes Phuong Garcia APRN   methylPREDNISolone (MEDROL) 4 MG dose pack TAKE BY MOUTH AS DIRECTED ON INSIDE OF PACKAGE 1/16/25   Phuong Garcia APRN   venlafaxine XR (EFFEXOR-XR) 37.5 MG 24 hr capsule Take 1 capsule  "by mouth Daily for 7 days, THEN 2 capsules Daily for 30 days. 2/28/25 4/6/25  Jose Phuong Jannette, AJ        Allergies:   Metformin    Health Maintenance Due   Topic Date Due    DIABETIC EYE EXAM  Never done    Hepatitis B (1 of 3 - 19+ 3-dose series) Never done    Pneumococcal Vaccine 50+ (1 of 2 - PCV) Never done    TDAP/TD VACCINES (1 - Tdap) Never done    MAMMOGRAM  Never done    ZOSTER VACCINE (1 of 2) Never done    DIABETIC FOOT EXAM  09/07/2023    COVID-19 Vaccine (4 - 2024-25 season) 09/01/2024    URINE MICROALBUMIN-CREATININE RATIO (uACR)  06/19/2025    HEMOGLOBIN A1C  07/07/2025         Vital Signs:   /58 (BP Location: Left arm, Patient Position: Sitting, Cuff Size: Adult)   Pulse 95   Temp 98.4 °F (36.9 °C) (Oral)   Ht 160 cm (63\")   Wt 64.5 kg (142 lb 4.8 oz)   SpO2 99%   BMI 25.21 kg/m²     Wt Readings from Last 3 Encounters:   04/09/25 64.5 kg (142 lb 4.8 oz)   01/17/25 62.3 kg (137 lb 4.8 oz)   01/07/25 64.1 kg (141 lb 6.4 oz)     BP Readings from Last 3 Encounters:   04/09/25 116/58   01/17/25 112/68   01/07/25 132/74       Physical Exam  Vitals reviewed.   Constitutional:       Appearance: Normal appearance. She is well-developed.   HENT:      Head: Normocephalic and atraumatic.   Eyes:      Conjunctiva/sclera: Conjunctivae normal.      Pupils: Pupils are equal, round, and reactive to light.   Cardiovascular:      Rate and Rhythm: Normal rate and regular rhythm.      Heart sounds: No murmur heard.     No friction rub. No gallop.   Pulmonary:      Effort: Pulmonary effort is normal.      Breath sounds: Normal breath sounds. No wheezing or rhonchi.   Abdominal:      General: Bowel sounds are normal. There is no distension.      Palpations: Abdomen is soft.      Tenderness: There is no abdominal tenderness.   Skin:     General: Skin is warm and dry.   Neurological:      Mental Status: She is alert and oriented to person, place, and time.      Cranial Nerves: No cranial nerve deficit. "   Psychiatric:         Mood and Affect: Mood and affect normal.         Behavior: Behavior normal.         Thought Content: Thought content normal.         Judgment: Judgment normal.     Physical Exam  Lungs are clear.    Vital Signs  Blood pressure is 116/58.      Result Review :    The following data was reviewed by AJ Perez on 04/09/25 at 07:59 EDT:    Common labs          6/19/2024    16:03 6/19/2024    16:43 1/7/2025    15:44   Common Labs   Glucose   138    BUN   15    Creatinine   0.71    Sodium   141    Potassium   4.8    Chloride   106    Calcium   9.5    Albumin   4.7    Total Bilirubin   0.2    Alkaline Phosphatase   93    AST (SGOT)   30    ALT (SGPT)   39    WBC   11.66    Hemoglobin   14.1    Hematocrit   40.6    Platelets   501    Total Cholesterol   220    Triglycerides   72    HDL Cholesterol   99    LDL Cholesterol    109    Hemoglobin A1C  6.20  6.40    Microalbumin, Urine 1.4          No Images in the past 120 days found..    Results  Laboratory Studies  A1c was 6.4% in 01/2025.               Assessment and Plan    Diagnoses and all orders for this visit:    1. Essential hypertension (Primary)    2. Mixed hyperlipidemia    3. Moderate episode of recurrent major depressive disorder    4. Anxiety    5. Type 2 diabetes mellitus with hyperglycemia, without long-term current use of insulin    6. Vasomotor symptoms due to menopause    7. Dysuria    8. Urinary frequency  -     POCT urinalysis dipstick, manual    9. Moderate asthma with exacerbation, unspecified whether persistent    10. Mild persistent asthma without complication       Assessment & Plan  1. Hypertension.  Her blood pressure is well controlled on lisinopril 20 mg daily. Blood pressure today is appropriate at 116/58.    2. Hyperlipidemia.  She has been on atorvastatin for 3 months and reports no adverse effects. Labs will be repeated during the next visit to assess lipid panel and liver enzymes.    3. Type 2 Diabetes  Mellitus.  She is on Trulicity 1.5 mg once weekly for diabetes, and her most recent A1c in 01/2025 was at 6.4% and stable. A1c will be checked during the next visit.    4. Asthma.  She continues to use Trelegy and Airsupra. A prescription for steroids will be provided to have on hand in case of exacerbations.    5. Depression.  She was previously on Zoloft but felt it was not beneficial. Effexor was prescribed in 01/2025 to improve her depression and anxiety-related symptoms, as well as vasomotor symptoms. She reports feeling better overall since starting Effexor.    6. Anxiety.  Effexor is being used to manage her anxiety symptoms. She reports feeling better overall since starting Effexor.    7. Attention Deficit Hyperactivity Disorder (ADHD).  She discontinued Adderall due to the inconvenience of monthly drug screens and refills. A referral to a psychiatrist within [facility], such as JOANNA Anne, who offers telehealth services, has been suggested for further management of her ADHD. She will consider this option and decide by the next visit.    8. Urinary frequency and dysuria.  A urinalysis will be conducted today to investigate the cause of her urinary frequency and dysuria.          Smoking Cessation:    Beba Ambrose  reports that she quit smoking about 5 years ago. Her smoking use included cigarettes. She started smoking about 20 years ago. She has a 7.5 pack-year smoking history. She has never used smokeless tobacco.             Follow Up   Return in about 3 months (around 7/9/2025) for Next scheduled follow up.  Patient was given instructions and counseling regarding her condition or for health maintenance advice. Please see specific information pulled into the AVS if appropriate.     Please note that portions of this note were completed with a voice recognition program.    Patient or patient representative verbalized consent for the use of Ambient Listening during the visit with  Phuong Garcia  APRN for chart documentation. 4/9/2025  10:27 EDT

## 2025-05-13 ENCOUNTER — HOSPITAL ENCOUNTER (OUTPATIENT)
Dept: MAMMOGRAPHY | Facility: HOSPITAL | Age: 55
Discharge: HOME | End: 2025-05-13
Payer: COMMERCIAL

## 2025-05-13 DIAGNOSIS — Z12.31 ENCOUNTER FOR SCREENING MAMMOGRAM FOR MALIGNANT NEOPLASM OF BREAST: ICD-10-CM

## 2025-05-13 PROCEDURE — 77067 SCR MAMMO BI INCL CAD: CPT

## 2025-05-13 PROCEDURE — 77063 BREAST TOMOSYNTHESIS BI: CPT

## 2025-05-15 DIAGNOSIS — J45.901 MODERATE ASTHMA WITH EXACERBATION, UNSPECIFIED WHETHER PERSISTENT: ICD-10-CM

## 2025-05-15 RX ORDER — METHYLPREDNISOLONE 4 MG/1
TABLET ORAL
Qty: 21 TABLET | Refills: 0 | OUTPATIENT
Start: 2025-05-15

## 2025-07-03 RX ORDER — DULAGLUTIDE 1.5 MG/.5ML
INJECTION, SOLUTION SUBCUTANEOUS
Qty: 24 ML | Refills: 0 | Status: SHIPPED | OUTPATIENT
Start: 2025-07-03

## 2025-07-23 DIAGNOSIS — J45.30 MILD PERSISTENT ASTHMA WITHOUT COMPLICATION: ICD-10-CM

## 2025-07-24 RX ORDER — FLUTICASONE FUROATE, UMECLIDINIUM BROMIDE AND VILANTEROL TRIFENATATE 200; 62.5; 25 UG/1; UG/1; UG/1
1 POWDER RESPIRATORY (INHALATION) DAILY
Qty: 60 EACH | Refills: 3 | Status: SHIPPED | OUTPATIENT
Start: 2025-07-24

## (undated) DEVICE — GOWN,REINFORCE,POLY,SIRUS,BREATH SLV,XLG: Brand: MEDLINE

## (undated) DEVICE — GLV SURG SENSICARE PI ORTHO SZ6.5 LF STRL

## (undated) DEVICE — ELECTRD LP COVIDIEN LLETZ TUNG 15X20MM

## (undated) DEVICE — SYR CONTRL LUERLOK 10CC

## (undated) DEVICE — NDL SPINE 22G 31/2IN BLK

## (undated) DEVICE — NDL HYPO ECLPS SFTY 18G 1 1/2IN

## (undated) DEVICE — NON-ADHERENT PADS PREPACK: Brand: TELFA

## (undated) DEVICE — MINOR-LF: Brand: MEDLINE INDUSTRIES, INC.

## (undated) DEVICE — LEGGINGS, PAIR, CLEAR, STERILE: Brand: MEDLINE

## (undated) DEVICE — CATH URETH INTRMIT ALLPURP LTX 16F RED

## (undated) DEVICE — PROCTO SWABS: Brand: DEROYAL

## (undated) DEVICE — NDL HYPO ECLPS SFTY 22G 1 1/2IN

## (undated) DEVICE — DRAPE,UNDERBUTTOCKS,PCH,STERILE: Brand: MEDLINE

## (undated) DEVICE — PAD SANI MAXI W/ADHS SNG WRP 11IN

## (undated) DEVICE — ELECTRD LP COVIDIEN LLETZ TUNG 12X15MM

## (undated) DEVICE — ELECTRD BALL LLETZ 5MM 13CM STRL

## (undated) DEVICE — MEDI-VAC NON-CONDUCTIVE SUCTION TUBING: Brand: CARDINAL HEALTH

## (undated) DEVICE — TOWEL,OR,DSP,ST,BLUE,STD,4/PK,20PK/CS: Brand: MEDLINE

## (undated) DEVICE — SLV SCD KN/LEN ADJ EXPRSS BLENDED MD 1P/U